# Patient Record
Sex: FEMALE | Race: WHITE | NOT HISPANIC OR LATINO | RURAL
[De-identification: names, ages, dates, MRNs, and addresses within clinical notes are randomized per-mention and may not be internally consistent; named-entity substitution may affect disease eponyms.]

---

## 2019-04-16 ENCOUNTER — HISTORICAL (OUTPATIENT)
Dept: ADMINISTRATIVE | Facility: HOSPITAL | Age: 34
End: 2019-04-16

## 2019-04-18 LAB
LAB AP CLINICAL INFORMATION: NORMAL
LAB AP GENERAL CAT - HISTORICAL: NORMAL
LAB AP INTERPRETATION/RESULT - HISTORICAL: NEGATIVE
LAB AP SPECIMEN ADEQUACY - HISTORICAL: NORMAL
LAB AP SPECIMEN SUBMITTED - HISTORICAL: NORMAL

## 2020-09-02 ENCOUNTER — HISTORICAL (OUTPATIENT)
Dept: ADMINISTRATIVE | Facility: HOSPITAL | Age: 35
End: 2020-09-02

## 2020-09-02 LAB — SARS-COV+SARS-COV-2 AG RESP QL IA.RAPID: NEGATIVE

## 2020-09-08 ENCOUNTER — HISTORICAL (OUTPATIENT)
Dept: ADMINISTRATIVE | Facility: HOSPITAL | Age: 35
End: 2020-09-08

## 2020-09-08 LAB — SARS-COV+SARS-COV-2 AG RESP QL IA.RAPID: NEGATIVE

## 2020-09-10 ENCOUNTER — HISTORICAL (OUTPATIENT)
Dept: ADMINISTRATIVE | Facility: HOSPITAL | Age: 35
End: 2020-09-10

## 2020-09-10 LAB — SARS-COV+SARS-COV-2 AG RESP QL IA.RAPID: NEGATIVE

## 2022-12-27 ENCOUNTER — OFFICE VISIT (OUTPATIENT)
Dept: PRIMARY CARE CLINIC | Facility: CLINIC | Age: 37
End: 2022-12-27
Payer: COMMERCIAL

## 2022-12-27 VITALS
OXYGEN SATURATION: 97 % | HEART RATE: 82 BPM | TEMPERATURE: 98 F | RESPIRATION RATE: 16 BRPM | WEIGHT: 204 LBS | DIASTOLIC BLOOD PRESSURE: 80 MMHG | HEIGHT: 67 IN | BODY MASS INDEX: 32.02 KG/M2 | SYSTOLIC BLOOD PRESSURE: 128 MMHG

## 2022-12-27 DIAGNOSIS — R07.0 PAIN IN THROAT: ICD-10-CM

## 2022-12-27 DIAGNOSIS — J02.9 ACUTE PHARYNGITIS, UNSPECIFIED ETIOLOGY: Primary | ICD-10-CM

## 2022-12-27 PROBLEM — E66.9 OBESITY: Status: ACTIVE | Noted: 2022-12-27

## 2022-12-27 PROBLEM — E78.2 MIXED HYPERLIPIDEMIA: Status: ACTIVE | Noted: 2022-12-27

## 2022-12-27 PROBLEM — F32.A MILD DEPRESSION: Status: ACTIVE | Noted: 2022-12-27

## 2022-12-27 PROBLEM — R13.10 DYSPHAGIA: Status: ACTIVE | Noted: 2022-07-06

## 2022-12-27 PROBLEM — F41.9 MILD ANXIETY: Status: ACTIVE | Noted: 2022-12-27

## 2022-12-27 PROBLEM — E78.5 DYSLIPIDEMIA: Status: ACTIVE | Noted: 2022-12-27

## 2022-12-27 PROCEDURE — 3074F SYST BP LT 130 MM HG: CPT | Mod: CPTII,,, | Performed by: NURSE PRACTITIONER

## 2022-12-27 PROCEDURE — 1159F MED LIST DOCD IN RCRD: CPT | Mod: CPTII,,, | Performed by: NURSE PRACTITIONER

## 2022-12-27 PROCEDURE — 3008F BODY MASS INDEX DOCD: CPT | Mod: CPTII,,, | Performed by: NURSE PRACTITIONER

## 2022-12-27 PROCEDURE — 3074F PR MOST RECENT SYSTOLIC BLOOD PRESSURE < 130 MM HG: ICD-10-PCS | Mod: CPTII,,, | Performed by: NURSE PRACTITIONER

## 2022-12-27 PROCEDURE — 1159F PR MEDICATION LIST DOCUMENTED IN MEDICAL RECORD: ICD-10-PCS | Mod: CPTII,,, | Performed by: NURSE PRACTITIONER

## 2022-12-27 PROCEDURE — 1160F RVW MEDS BY RX/DR IN RCRD: CPT | Mod: CPTII,,, | Performed by: NURSE PRACTITIONER

## 2022-12-27 PROCEDURE — 3008F PR BODY MASS INDEX (BMI) DOCUMENTED: ICD-10-PCS | Mod: CPTII,,, | Performed by: NURSE PRACTITIONER

## 2022-12-27 PROCEDURE — 3079F PR MOST RECENT DIASTOLIC BLOOD PRESSURE 80-89 MM HG: ICD-10-PCS | Mod: CPTII,,, | Performed by: NURSE PRACTITIONER

## 2022-12-27 PROCEDURE — 99203 PR OFFICE/OUTPT VISIT, NEW, LEVL III, 30-44 MIN: ICD-10-PCS | Mod: ,,, | Performed by: NURSE PRACTITIONER

## 2022-12-27 PROCEDURE — 99203 OFFICE O/P NEW LOW 30 MIN: CPT | Mod: ,,, | Performed by: NURSE PRACTITIONER

## 2022-12-27 PROCEDURE — 3079F DIAST BP 80-89 MM HG: CPT | Mod: CPTII,,, | Performed by: NURSE PRACTITIONER

## 2022-12-27 PROCEDURE — 1160F PR REVIEW ALL MEDS BY PRESCRIBER/CLIN PHARMACIST DOCUMENTED: ICD-10-PCS | Mod: CPTII,,, | Performed by: NURSE PRACTITIONER

## 2022-12-27 RX ORDER — PREDNISONE 5 MG/1
5 TABLET ORAL DAILY
COMMUNITY
Start: 2022-12-07

## 2022-12-27 RX ORDER — NORELGESTROMIN AND ETHINYL ESTRADIOL 150; 35 UG/D; UG/D
PATCH TRANSDERMAL
COMMUNITY
Start: 2022-12-03

## 2022-12-27 RX ORDER — AMOXICILLIN AND CLAVULANATE POTASSIUM 875; 125 MG/1; MG/1
1 TABLET, FILM COATED ORAL EVERY 12 HOURS
Qty: 20 TABLET | Refills: 0 | Status: SHIPPED | OUTPATIENT
Start: 2022-12-27 | End: 2023-01-06

## 2022-12-27 RX ORDER — PANTOPRAZOLE SODIUM 40 MG/1
40 TABLET, DELAYED RELEASE ORAL DAILY
COMMUNITY
Start: 2022-11-08

## 2022-12-27 RX ORDER — FLUOXETINE HYDROCHLORIDE 20 MG/1
20 CAPSULE ORAL DAILY
COMMUNITY

## 2022-12-27 NOTE — PROGRESS NOTES
Subjective:       Patient ID: Lori Flynn is a 37 y.o. female.    Chief Complaint: possible strep    Pt presents with sore throat and ear pain x several days.     Review of Systems   Constitutional:  Negative for activity change, appetite change, chills, fatigue and fever.   HENT:  Positive for ear pain and sore throat. Negative for nasal congestion, ear discharge, nosebleeds, postnasal drip, rhinorrhea, sinus pressure/congestion, sneezing and tinnitus.    Eyes:  Negative for pain, discharge, redness and itching.   Respiratory:  Negative for cough, choking, chest tightness, shortness of breath and wheezing.    Cardiovascular:  Negative for chest pain.   Gastrointestinal:  Negative for abdominal distention, abdominal pain, blood in stool, change in bowel habit, constipation, diarrhea, nausea, vomiting and change in bowel habit.   Genitourinary:  Negative for decreased urine volume, dysuria, flank pain, frequency, menstrual irregularity and menstrual problem.   Musculoskeletal:  Negative for back pain and gait problem.   Integumentary:  Negative for wound, breast mass and breast discharge.   Allergic/Immunologic: Negative for immunocompromised state.   Neurological:  Negative for dizziness, light-headedness and headaches.   Psychiatric/Behavioral:  Negative for agitation, behavioral problems and hallucinations.    Breast: Negative for mass      Objective:      Physical Exam  Vitals and nursing note reviewed.   Constitutional:       Appearance: Normal appearance.   HENT:      Head: Normocephalic.      Right Ear: Tympanic membrane normal.      Left Ear: Tympanic membrane normal.      Nose: Congestion present.      Mouth/Throat:      Pharynx: Posterior oropharyngeal erythema present.   Eyes:      Conjunctiva/sclera: Conjunctivae normal.   Cardiovascular:      Rate and Rhythm: Normal rate and regular rhythm.      Heart sounds: Normal heart sounds.   Pulmonary:      Effort: Pulmonary effort is normal.      Breath sounds:  Normal breath sounds.   Musculoskeletal:         General: Normal range of motion.   Neurological:      Mental Status: She is alert and oriented to person, place, and time.   Psychiatric:         Mood and Affect: Mood normal.         Behavior: Behavior normal.       Assessment:       Problem List Items Addressed This Visit    None  Visit Diagnoses       Acute pharyngitis, unspecified etiology    -  Primary    Relevant Medications    amoxicillin-clavulanate 875-125mg (AUGMENTIN) 875-125 mg per tablet    Pain in throat                  Plan:       Continue prednisone as directed. Change toothbrush in 2 days.

## 2023-09-06 ENCOUNTER — OFFICE VISIT (OUTPATIENT)
Dept: OBSTETRICS AND GYNECOLOGY | Facility: CLINIC | Age: 38
End: 2023-09-06
Payer: COMMERCIAL

## 2023-09-06 VITALS
WEIGHT: 190 LBS | SYSTOLIC BLOOD PRESSURE: 132 MMHG | OXYGEN SATURATION: 100 % | DIASTOLIC BLOOD PRESSURE: 81 MMHG | RESPIRATION RATE: 18 BRPM | BODY MASS INDEX: 29.82 KG/M2 | HEART RATE: 86 BPM | HEIGHT: 67 IN

## 2023-09-06 DIAGNOSIS — N94.6 DYSMENORRHEA: ICD-10-CM

## 2023-09-06 DIAGNOSIS — N92.0 MENORRHAGIA WITH REGULAR CYCLE: ICD-10-CM

## 2023-09-06 DIAGNOSIS — N80.9 ENDOMETRIOSIS: Primary | ICD-10-CM

## 2023-09-06 PROCEDURE — 99203 OFFICE O/P NEW LOW 30 MIN: CPT | Mod: S$PBB,,, | Performed by: OBSTETRICS & GYNECOLOGY

## 2023-09-06 PROCEDURE — 3079F PR MOST RECENT DIASTOLIC BLOOD PRESSURE 80-89 MM HG: ICD-10-PCS | Mod: CPTII,,, | Performed by: OBSTETRICS & GYNECOLOGY

## 2023-09-06 PROCEDURE — 3075F PR MOST RECENT SYSTOLIC BLOOD PRESS GE 130-139MM HG: ICD-10-PCS | Mod: CPTII,,, | Performed by: OBSTETRICS & GYNECOLOGY

## 2023-09-06 PROCEDURE — 1159F MED LIST DOCD IN RCRD: CPT | Mod: CPTII,,, | Performed by: OBSTETRICS & GYNECOLOGY

## 2023-09-06 PROCEDURE — 3008F BODY MASS INDEX DOCD: CPT | Mod: CPTII,,, | Performed by: OBSTETRICS & GYNECOLOGY

## 2023-09-06 PROCEDURE — 99203 PR OFFICE/OUTPT VISIT, NEW, LEVL III, 30-44 MIN: ICD-10-PCS | Mod: S$PBB,,, | Performed by: OBSTETRICS & GYNECOLOGY

## 2023-09-06 PROCEDURE — 3075F SYST BP GE 130 - 139MM HG: CPT | Mod: CPTII,,, | Performed by: OBSTETRICS & GYNECOLOGY

## 2023-09-06 PROCEDURE — 1160F PR REVIEW ALL MEDS BY PRESCRIBER/CLIN PHARMACIST DOCUMENTED: ICD-10-PCS | Mod: CPTII,,, | Performed by: OBSTETRICS & GYNECOLOGY

## 2023-09-06 PROCEDURE — 3079F DIAST BP 80-89 MM HG: CPT | Mod: CPTII,,, | Performed by: OBSTETRICS & GYNECOLOGY

## 2023-09-06 PROCEDURE — 1159F PR MEDICATION LIST DOCUMENTED IN MEDICAL RECORD: ICD-10-PCS | Mod: CPTII,,, | Performed by: OBSTETRICS & GYNECOLOGY

## 2023-09-06 PROCEDURE — 1160F RVW MEDS BY RX/DR IN RCRD: CPT | Mod: CPTII,,, | Performed by: OBSTETRICS & GYNECOLOGY

## 2023-09-06 PROCEDURE — 99214 OFFICE O/P EST MOD 30 MIN: CPT | Mod: PBBFAC | Performed by: OBSTETRICS & GYNECOLOGY

## 2023-09-06 PROCEDURE — 3008F PR BODY MASS INDEX (BMI) DOCUMENTED: ICD-10-PCS | Mod: CPTII,,, | Performed by: OBSTETRICS & GYNECOLOGY

## 2023-09-06 NOTE — PROGRESS NOTES
Gynecology History and Physical    Assessment/Plan:   Problem List Items Addressed This Visit          Renal/    Endometriosis - Primary    Menorrhagia with regular cycle    Dysmenorrhea     Discussed treatment options for endometriosis including Mirena IUD. Discussed hormonal side effects would be minimal compared to OCPs due to LARC. Discussed that by 6 months, 80% of women have amenorrhea. She brought up a tubal ligation and an ablation. Discussed with her that I would not recommend this due to the fact that her menses may get lighter or resolve. However, this would not improve pain associated with menses. Also discussed definitive surgical management via a hysterectomy. She verbalized her understanding of the above. She is going to think about it and decide what route she would like to take and call us back.       CC:   Chief Complaint   Patient presents with    Consult     tubal     HPI: Lori Flynn is a 37 y.o. female who presents with complaints of painful and heavy menses. She has been diagnosed with endometriosis. She feels that her menses are becoming more painful and more heavy. She has discussed treatment options with her previous providers. She was considering a Mirena IUD. However, she is hesitant about this due to side effects with hormonal contraception in the past. She has headaches, mood changes, and overall does not feel well. Discussed progresterone only especially long acting is less likely to give her side effects.  She also tried Depo Lupron in the past and hated the side effects. She felt like she was going crazy and the hot flashes and night sweats were miserable.  She had her annual exam in July at another provider's office.    Review of Systems: The following ROS was otherwise negative, except as noted in the HPI:  constitutional, HEENT, respiratory, cardiovascular, gastrointestinal, genitourinary, skin, musculoskeletal, neurological, psych    Gynecologic History:   No history of  "abnormal pap smears  No history of of STIs  Menstrual history:       Obstetrical History:  OB History          2    Para   2    Term   2            AB        Living   2         SAB        IAB        Ectopic        Multiple        Live Births   2           Obstetric Comments   2 FTCS's without complication               Past Medical History:   History reviewed. No pertinent past medical history.    Medications:  Medication List with Changes/Refills   Current Medications    FLUOXETINE 20 MG CAPSULE    Take 20 mg by mouth once daily.    PANTOPRAZOLE (PROTONIX) 40 MG TABLET    Take 40 mg by mouth once daily.    PREDNISONE (DELTASONE) 5 MG TABLET    Take 5 mg by mouth once daily.    XULANE 150-35 MCG/24 HR    SMARTSIG:Topical       Allergies:  Patient has no known allergies.    Surgical History:  Past Surgical History:   Procedure Laterality Date     SECTION     C/S x 2    Family History:  Family History   Problem Relation Age of Onset    Diabetes Mother     Cancer Father        Social History:  Social History     Substance and Sexual Activity   Alcohol Use Not Currently     Social History     Substance and Sexual Activity   Drug Use Never     Social History     Tobacco Use   Smoking Status Never   Smokeless Tobacco Never       Physical Exam:  /81   Pulse 86   Resp 18   Ht 5' 7" (1.702 m)   Wt 86.2 kg (190 lb)   LMP 2023   SpO2 100%   BMI 29.76 kg/m²     General: Alert, well appearing, no acute distress  Head: Normocephalic, atraumatic  Psych: No depression or anxiety.    Labs:  No visits with results within 1 Day(s) from this visit.   Latest known visit with results is:   Historical on 09/10/2020   Component Date Value    COVID-19 Ag 09/10/2020 Negative               "

## 2023-09-08 PROBLEM — N80.9 ENDOMETRIOSIS: Status: ACTIVE | Noted: 2023-09-08

## 2023-09-08 PROBLEM — N94.6 DYSMENORRHEA: Status: ACTIVE | Noted: 2023-09-08

## 2023-09-08 PROBLEM — N92.0 MENORRHAGIA WITH REGULAR CYCLE: Status: ACTIVE | Noted: 2023-09-08

## 2024-01-28 ENCOUNTER — OFFICE VISIT (OUTPATIENT)
Dept: FAMILY MEDICINE | Facility: CLINIC | Age: 39
End: 2024-01-28
Payer: COMMERCIAL

## 2024-01-28 VITALS
RESPIRATION RATE: 20 BRPM | HEART RATE: 86 BPM | WEIGHT: 206 LBS | HEIGHT: 67 IN | DIASTOLIC BLOOD PRESSURE: 83 MMHG | TEMPERATURE: 99 F | SYSTOLIC BLOOD PRESSURE: 126 MMHG | OXYGEN SATURATION: 98 % | BODY MASS INDEX: 32.33 KG/M2

## 2024-01-28 DIAGNOSIS — J02.9 SORE THROAT: ICD-10-CM

## 2024-01-28 DIAGNOSIS — J02.0 STREP PHARYNGITIS: Primary | ICD-10-CM

## 2024-01-28 LAB
CTP QC/QA: YES
S PYO RRNA THROAT QL PROBE: POSITIVE

## 2024-01-28 PROCEDURE — 99051 MED SERV EVE/WKEND/HOLIDAY: CPT | Mod: ,,, | Performed by: NURSE PRACTITIONER

## 2024-01-28 PROCEDURE — 99213 OFFICE O/P EST LOW 20 MIN: CPT | Mod: ,,, | Performed by: NURSE PRACTITIONER

## 2024-01-28 PROCEDURE — 3074F SYST BP LT 130 MM HG: CPT | Mod: CPTII,,, | Performed by: NURSE PRACTITIONER

## 2024-01-28 PROCEDURE — 1160F RVW MEDS BY RX/DR IN RCRD: CPT | Mod: CPTII,,, | Performed by: NURSE PRACTITIONER

## 2024-01-28 PROCEDURE — 1159F MED LIST DOCD IN RCRD: CPT | Mod: CPTII,,, | Performed by: NURSE PRACTITIONER

## 2024-01-28 PROCEDURE — 3079F DIAST BP 80-89 MM HG: CPT | Mod: CPTII,,, | Performed by: NURSE PRACTITIONER

## 2024-01-28 PROCEDURE — 3008F BODY MASS INDEX DOCD: CPT | Mod: CPTII,,, | Performed by: NURSE PRACTITIONER

## 2024-01-28 PROCEDURE — 87880 STREP A ASSAY W/OPTIC: CPT | Mod: QW,,, | Performed by: NURSE PRACTITIONER

## 2024-01-28 RX ORDER — AMOXICILLIN 500 MG/1
500 TABLET, FILM COATED ORAL EVERY 12 HOURS
Qty: 20 TABLET | Refills: 0 | Status: SHIPPED | OUTPATIENT
Start: 2024-01-28 | End: 2024-02-07

## 2024-01-28 NOTE — PROGRESS NOTES
SHANTEL Pineda   [facility] - [facility]  [address] [place] [address]  [phone]      PATIENT NAME: Lori Flynn  : 1985  DATE: 24  MRN: [id number]      Billing Provider: SHANTEL Pineda  Level of Service: [place] OFFICE/OUTPT VISIT, EST, LEVL III, 20-29 MIN  Patient PCP Information       Provider PCP Type    Yasmany Villegas MD General            Reason for Visit / Chief Complaint: Otalgia (Both Ears ), Sore Throat (X2 days; 10 PS ), and Fever (X1 days )       Update PCP  Update Chief Complaint         History of Present Illness / Problem Focused Workflow       Patient presents to clinic with the above listed complaints. She is COVID positive today.       Review of Systems     Review of Systems   Constitutional:  Positive for fever. Negative for chills, diaphoresis and fatigue.   HENT:  Positive for ear pain and sore throat. Negative for congestion, facial swelling and trouble swallowing.    Eyes:  Negative for pain, discharge, redness, itching and visual disturbance.   Respiratory:  Negative for apnea, cough, chest tightness, shortness of breath and wheezing.    Cardiovascular:  Negative for chest pain, palpitations and leg swelling.   Gastrointestinal:  Negative for abdominal pain, constipation, diarrhea, nausea and vomiting.   Genitourinary:  Negative for dysuria.   Skin:  Negative for rash.   Neurological:  Negative for dizziness and headaches.       Medical / Social / Family History   History reviewed. No pertinent past medical history.    Past Surgical History:   Procedure Laterality Date     SECTION         Social History  Ms.  reports that she has never smoked. She has never used smokeless tobacco. She reports that she does not currently use alcohol. She reports that she does not use drugs.    Family History  Ms.'s family history includes Cancer in her father; Diabetes in her mother.    Medications  and Allergies     Medications  No outpatient medications have been marked as taking for the 1/28/24 encounter (Office Visit) with Sanam Morgan FNP.       Allergies  Review of patient's allergies indicates:  No Known Allergies    Physical Examination     Vitals:    01/28/24 1459   BP: 126/83   Pulse: 86   Resp: 20   Temp: 98.7 °F (37.1 °C)     Physical Exam  Vitals and nursing note reviewed.   Constitutional:       General: She is awake.      Appearance: Normal appearance.   HENT:      Head: Normocephalic.      Right Ear: Tympanic membrane, ear canal and external ear normal.      Left Ear: Tympanic membrane, ear canal and external ear normal.      Nose: Nose normal.      Mouth/Throat:      Lips: Pink.      Mouth: Mucous membranes are moist.      Pharynx: Oropharynx is clear. Uvula midline. Posterior oropharyngeal erythema present.      Comments: History of tonsillectomy  Eyes:      General: Lids are normal.      Extraocular Movements: Extraocular movements intact.      Conjunctiva/sclera: Conjunctivae normal.      Pupils: Pupils are equal, round, and reactive to light.   Cardiovascular:      Rate and Rhythm: Normal rate and regular rhythm.      Pulses: Normal pulses.      Heart sounds: Normal heart sounds. No murmur heard.  Pulmonary:      Effort: Pulmonary effort is normal.      Breath sounds: Normal breath sounds. No decreased breath sounds, wheezing, rhonchi or rales.   Musculoskeletal:      Right lower leg: No edema.      Left lower leg: No edema.   Skin:     General: Skin is warm.      Coloration: Skin is not jaundiced.      Findings: No rash.   Neurological:      Mental Status: She is alert. Mental status is at baseline.   Psychiatric:         Mood and Affect: Mood normal.         Behavior: Behavior normal. Behavior is cooperative.         Assessment and Plan (including Health Maintenance)      Problem List  Smart Sets  Document Outside HM   :    Health Maintenance Due   Topic Date Due    Hepatitis C  Screening  Never done    Lipid Panel  Never done    COVID-19 Vaccine (1) Never done    HIV Screening  Never done    TETANUS VACCINE  Never done    Mammogram  Never done    Hemoglobin A1c (Diabetic Prevention Screening)  Never done    Cervical Cancer Screening  04/16/2022    Influenza Vaccine (1) Never done       Problem List Items Addressed This Visit    None  Visit Diagnoses       Strep pharyngitis    -  Primary    Sore throat        Relevant Orders    POCT rapid strep A (Completed)            The patient has no Health Maintenance topics of status Not Due    No future appointments.       Signature:  SHANTEL Pineda [facility] - [facility] - [facility]  [address] [place] [address] [place] [address]  [phone]    Date of encounter: 1/28/24

## 2024-01-28 NOTE — LETTER
January 28, 2024    Lori Flynn  [address] [address] [facility]  [place] [address]             [facility] - [facility] Medicine  Family Medicine  [address] [place] [address] [place] [address]  Phone: [phone]  Fax: [phone]   January 28, 2024     Patient: Lori Flynn   YOB: 1985   Date of Visit: 1/28/2024       To Whom it May Concern:    Lori Flynn was seen in my clinic on 1/28/2024. She may return to work on 01/30/2024 .    Please excuse her from any classes or work missed.    If you have any questions or concerns, please don't hesitate to call.    Sincerely,         Sanam Morgan, FNP

## 2024-04-09 ENCOUNTER — OFFICE VISIT (OUTPATIENT)
Dept: PRIMARY CARE CLINIC | Facility: CLINIC | Age: 39
End: 2024-04-09
Payer: COMMERCIAL

## 2024-04-09 ENCOUNTER — PATIENT MESSAGE (OUTPATIENT)
Dept: PRIMARY CARE CLINIC | Facility: CLINIC | Age: 39
End: 2024-04-09
Payer: COMMERCIAL

## 2024-04-09 VITALS
BODY MASS INDEX: 33.27 KG/M2 | HEIGHT: 67 IN | WEIGHT: 212 LBS | OXYGEN SATURATION: 99 % | SYSTOLIC BLOOD PRESSURE: 126 MMHG | TEMPERATURE: 99 F | DIASTOLIC BLOOD PRESSURE: 84 MMHG

## 2024-04-09 DIAGNOSIS — Z13.220 ENCOUNTER FOR SCREENING FOR LIPOID DISORDERS: ICD-10-CM

## 2024-04-09 DIAGNOSIS — E78.5 DYSLIPIDEMIA: ICD-10-CM

## 2024-04-09 DIAGNOSIS — Z11.59 NEED FOR HEPATITIS C SCREENING TEST: ICD-10-CM

## 2024-04-09 DIAGNOSIS — Z12.4 SCREENING FOR MALIGNANT NEOPLASM OF CERVIX: ICD-10-CM

## 2024-04-09 DIAGNOSIS — F41.9 ANXIETY: ICD-10-CM

## 2024-04-09 DIAGNOSIS — E66.09 CLASS 1 OBESITY DUE TO EXCESS CALORIES WITH BODY MASS INDEX (BMI) OF 33.0 TO 33.9 IN ADULT, UNSPECIFIED WHETHER SERIOUS COMORBIDITY PRESENT: ICD-10-CM

## 2024-04-09 DIAGNOSIS — Z11.4 SCREENING FOR HIV (HUMAN IMMUNODEFICIENCY VIRUS): ICD-10-CM

## 2024-04-09 DIAGNOSIS — Z13.1 SCREENING FOR DIABETES MELLITUS: ICD-10-CM

## 2024-04-09 DIAGNOSIS — Z12.39 ENCOUNTER FOR SCREENING FOR MALIGNANT NEOPLASM OF BREAST, UNSPECIFIED SCREENING MODALITY: ICD-10-CM

## 2024-04-09 DIAGNOSIS — Z00.01 ENCOUNTER FOR GENERAL ADULT MEDICAL EXAMINATION WITH ABNORMAL FINDINGS: Primary | ICD-10-CM

## 2024-04-09 PROCEDURE — 99396 PREV VISIT EST AGE 40-64: CPT | Mod: ,,, | Performed by: NURSE PRACTITIONER

## 2024-04-09 PROCEDURE — 3008F BODY MASS INDEX DOCD: CPT | Mod: CPTII,,, | Performed by: NURSE PRACTITIONER

## 2024-04-09 PROCEDURE — 3079F DIAST BP 80-89 MM HG: CPT | Mod: CPTII,,, | Performed by: NURSE PRACTITIONER

## 2024-04-09 PROCEDURE — 1159F MED LIST DOCD IN RCRD: CPT | Mod: CPTII,,, | Performed by: NURSE PRACTITIONER

## 2024-04-09 PROCEDURE — 1160F RVW MEDS BY RX/DR IN RCRD: CPT | Mod: CPTII,,, | Performed by: NURSE PRACTITIONER

## 2024-04-09 PROCEDURE — 3074F SYST BP LT 130 MM HG: CPT | Mod: CPTII,,, | Performed by: NURSE PRACTITIONER

## 2024-04-09 RX ORDER — TIRZEPATIDE 5 MG/.5ML
5 INJECTION, SOLUTION SUBCUTANEOUS
Qty: 12 PEN | Refills: 3 | Status: SHIPPED | OUTPATIENT
Start: 2024-05-09 | End: 2024-04-15

## 2024-04-09 RX ORDER — FLUOXETINE HYDROCHLORIDE 20 MG/1
20 CAPSULE ORAL DAILY
Qty: 90 CAPSULE | Refills: 3 | Status: SHIPPED | OUTPATIENT
Start: 2024-04-09

## 2024-04-09 RX ORDER — BUSPIRONE HYDROCHLORIDE 10 MG/1
10 TABLET ORAL 2 TIMES DAILY
Qty: 180 TABLET | Refills: 3 | Status: SHIPPED | OUTPATIENT
Start: 2024-04-09

## 2024-04-09 RX ORDER — TIRZEPATIDE 2.5 MG/.5ML
2.5 INJECTION, SOLUTION SUBCUTANEOUS
Qty: 4 PEN | Refills: 0 | Status: SHIPPED | OUTPATIENT
Start: 2024-04-09 | End: 2024-04-15

## 2024-04-09 NOTE — PROGRESS NOTES
Subjective     Patient ID: Lori Flynn is a 38 y.o. female.    Chief Complaint: wellness (Pt Concerns Weight and anxiety.)    Pt presents for a wellness visit and to establish care.       Review of Systems   Constitutional:  Negative for activity change, appetite change, chills, fatigue, fever and unexpected weight change.   HENT:  Negative for nasal congestion, ear discharge, hearing loss, nosebleeds, postnasal drip, rhinorrhea, sinus pressure/congestion, sneezing, sore throat, tinnitus and trouble swallowing.    Eyes:  Negative for pain, discharge, redness, itching and visual disturbance.   Respiratory:  Negative for cough, choking, chest tightness, shortness of breath and wheezing.    Cardiovascular:  Negative for chest pain and palpitations.   Gastrointestinal:  Negative for abdominal distention, abdominal pain, blood in stool, change in bowel habit, constipation, diarrhea, nausea and vomiting.   Endocrine: Negative for polydipsia and polyuria.   Genitourinary:  Negative for decreased urine volume, difficulty urinating, dysuria, flank pain, frequency, hematuria, menstrual irregularity and menstrual problem.   Musculoskeletal:  Negative for arthralgias, back pain, gait problem, joint swelling and neck pain.   Integumentary:  Negative for wound, breast mass and breast discharge.   Allergic/Immunologic: Negative for immunocompromised state.   Neurological:  Negative for dizziness, weakness, light-headedness and headaches.   Psychiatric/Behavioral:  Negative for agitation, behavioral problems, confusion, dysphoric mood and hallucinations. The patient is nervous/anxious.    Breast: Negative for mass         Objective     Physical Exam  Vitals and nursing note reviewed.   Constitutional:       Appearance: Normal appearance.   Cardiovascular:      Rate and Rhythm: Normal rate and regular rhythm.      Heart sounds: Normal heart sounds.   Pulmonary:      Effort: Pulmonary effort is normal.      Breath sounds: Normal  breath sounds.   Musculoskeletal:         General: Normal range of motion.   Neurological:      Mental Status: She is alert and oriented to person, place, and time.   Psychiatric:         Mood and Affect: Mood normal.         Behavior: Behavior normal.            Assessment and Plan     1. Encounter for general adult medical examination with abnormal findings    2. BMI 33.0-33.9,adult    3. Need for hepatitis C screening test  -     Hepatitis C Antibody; Future; Expected date: 04/09/2024    4. Screening for HIV (human immunodeficiency virus)  -     HIV 1/2 Ag/Ab (4th Gen); Future; Expected date: 04/09/2024    5. Encounter for screening for lipoid disorders  -     Lipid Panel; Future; Expected date: 04/09/2024    6. Dyslipidemia  -     CBC Auto Differential; Future; Expected date: 04/09/2024  -     Comprehensive Metabolic Panel; Future; Expected date: 04/09/2024  -     TSH; Future; Expected date: 04/09/2024    7. Screening for diabetes mellitus  -     Hemoglobin A1C; Future; Expected date: 04/09/2024    8. Anxiety  -     busPIRone (BUSPAR) 10 MG tablet; Take 1 tablet (10 mg total) by mouth 2 (two) times daily.  Dispense: 180 tablet; Refill: 3  -     FLUoxetine 20 MG capsule; Take 1 capsule (20 mg total) by mouth once daily.  Dispense: 90 capsule; Refill: 3    9. Class 1 obesity due to excess calories with body mass index (BMI) of 33.0 to 33.9 in adult, unspecified whether serious comorbidity present  -     tirzepatide, weight loss, (ZEPBOUND) 2.5 mg/0.5 mL PnIj; Inject 2.5 mg into the skin every 7 days.  Dispense: 4 Pen; Refill: 0  -     tirzepatide, weight loss, (ZEPBOUND) 5 mg/0.5 mL PnIj; Inject 5 mg into the skin every 7 days.  Dispense: 12 Pen; Refill: 3    10. Encounter for screening for malignant neoplasm of breast, unspecified screening modality  -     Mammo Digital Screening Bilat w/ Jovanni; Future; Expected date: 04/09/2024    11. Screening for malignant neoplasm of cervix  -     Ambulatory referral/consult  to Obstetrics / Gynecology; Future; Expected date: 04/16/2024        Will call pt with lab results. Gave pt the appointments for gyn and mammogram. Discussed low sugar diet and exercise 3-5 times weekly.          Follow up in about 1 year (around 4/9/2025).

## 2024-04-10 ENCOUNTER — HOSPITAL ENCOUNTER (OUTPATIENT)
Dept: RADIOLOGY | Facility: HOSPITAL | Age: 39
Discharge: HOME | End: 2024-04-10
Attending: NURSE PRACTITIONER
Payer: COMMERCIAL

## 2024-04-10 DIAGNOSIS — Z12.39 ENCOUNTER FOR SCREENING FOR MALIGNANT NEOPLASM OF BREAST, UNSPECIFIED SCREENING MODALITY: ICD-10-CM

## 2024-04-10 PROCEDURE — 77063 BREAST TOMOSYNTHESIS BI: CPT | Mod: TC

## 2024-04-15 RX ORDER — SEMAGLUTIDE 0.25 MG/.5ML
0.25 INJECTION, SOLUTION SUBCUTANEOUS
Qty: 4 EACH | Refills: 0 | Status: SHIPPED | OUTPATIENT
Start: 2024-04-15 | End: 2024-04-25 | Stop reason: CLARIF

## 2024-04-21 ENCOUNTER — HOSPITAL ENCOUNTER (EMERGENCY)
Facility: HOSPITAL | Age: 39
Discharge: HOME | End: 2024-04-22
Attending: FAMILY MEDICINE
Payer: COMMERCIAL

## 2024-04-21 DIAGNOSIS — N20.0 NEPHROLITHIASIS: Primary | ICD-10-CM

## 2024-04-21 LAB
B-HCG UR QL: NEGATIVE
CTP QC/QA: YES

## 2024-04-21 PROCEDURE — 81025 URINE PREGNANCY TEST: CPT | Performed by: FAMILY MEDICINE

## 2024-04-21 PROCEDURE — 81001 URINALYSIS AUTO W/SCOPE: CPT | Performed by: FAMILY MEDICINE

## 2024-04-21 PROCEDURE — 99285 EMERGENCY DEPT VISIT HI MDM: CPT | Mod: ,,, | Performed by: FAMILY MEDICINE

## 2024-04-21 PROCEDURE — 85025 COMPLETE CBC W/AUTO DIFF WBC: CPT | Performed by: FAMILY MEDICINE

## 2024-04-21 PROCEDURE — 99285 EMERGENCY DEPT VISIT HI MDM: CPT | Mod: 25

## 2024-04-21 PROCEDURE — 80053 COMPREHEN METABOLIC PANEL: CPT | Performed by: FAMILY MEDICINE

## 2024-04-21 RX ORDER — ONDANSETRON HYDROCHLORIDE 2 MG/ML
4 INJECTION, SOLUTION INTRAVENOUS
Status: COMPLETED | OUTPATIENT
Start: 2024-04-22 | End: 2024-04-22

## 2024-04-21 RX ORDER — HYDROMORPHONE HYDROCHLORIDE 2 MG/ML
2 INJECTION, SOLUTION INTRAMUSCULAR; INTRAVENOUS; SUBCUTANEOUS
Status: COMPLETED | OUTPATIENT
Start: 2024-04-22 | End: 2024-04-22

## 2024-04-21 NOTE — Clinical Note
"Silvestre BECERRILGENARO Flynn was seen and treated in our emergency department on 4/21/2024.  She may return to work on 04/25/2024.       If you have any questions or concerns, please don't hesitate to call.      Lion Abad, DO"

## 2024-04-22 VITALS
SYSTOLIC BLOOD PRESSURE: 132 MMHG | BODY MASS INDEX: 32.02 KG/M2 | OXYGEN SATURATION: 96 % | RESPIRATION RATE: 18 BRPM | DIASTOLIC BLOOD PRESSURE: 84 MMHG | WEIGHT: 204 LBS | HEART RATE: 76 BPM | TEMPERATURE: 98 F | HEIGHT: 67 IN

## 2024-04-22 LAB
ALBUMIN SERPL BCP-MCNC: 3.8 G/DL (ref 3.5–5)
ALBUMIN/GLOB SERPL: 1.2 {RATIO}
ALP SERPL-CCNC: 48 U/L (ref 37–98)
ALT SERPL W P-5'-P-CCNC: 20 U/L (ref 13–56)
ANION GAP SERPL CALCULATED.3IONS-SCNC: 9 MMOL/L (ref 7–16)
AST SERPL W P-5'-P-CCNC: 15 U/L (ref 15–37)
BACTERIA #/AREA URNS HPF: ABNORMAL /HPF
BASOPHILS # BLD AUTO: 0.06 K/UL (ref 0–0.2)
BASOPHILS NFR BLD AUTO: 0.8 % (ref 0–1)
BILIRUB SERPL-MCNC: 0.4 MG/DL (ref ?–1.2)
BILIRUB UR QL STRIP: NEGATIVE
BUN SERPL-MCNC: 21 MG/DL (ref 7–18)
BUN/CREAT SERPL: 22 (ref 6–20)
CALCIUM SERPL-MCNC: 9.6 MG/DL (ref 8.5–10.1)
CHLORIDE SERPL-SCNC: 110 MMOL/L (ref 98–107)
CLARITY UR: CLEAR
CO2 SERPL-SCNC: 27 MMOL/L (ref 21–32)
COLOR UR: YELLOW
CREAT SERPL-MCNC: 0.95 MG/DL (ref 0.55–1.02)
DIFFERENTIAL METHOD BLD: ABNORMAL
EGFR (NO RACE VARIABLE) (RUSH/TITUS): 78 ML/MIN/1.73M2
EOSINOPHIL # BLD AUTO: 0.38 K/UL (ref 0–0.5)
EOSINOPHIL NFR BLD AUTO: 4.9 % (ref 1–4)
ERYTHROCYTE [DISTWIDTH] IN BLOOD BY AUTOMATED COUNT: 12.6 % (ref 11.5–14.5)
GLOBULIN SER-MCNC: 3.3 G/DL (ref 2–4)
GLUCOSE SERPL-MCNC: 149 MG/DL (ref 74–106)
GLUCOSE UR STRIP-MCNC: NORMAL MG/DL
HCT VFR BLD AUTO: 39.4 % (ref 38–47)
HGB BLD-MCNC: 12.6 G/DL (ref 12–16)
IMM GRANULOCYTES # BLD AUTO: 0.02 K/UL (ref 0–0.04)
IMM GRANULOCYTES NFR BLD: 0.3 % (ref 0–0.4)
KETONES UR STRIP-SCNC: NEGATIVE MG/DL
LEUKOCYTE ESTERASE UR QL STRIP: NEGATIVE
LYMPHOCYTES # BLD AUTO: 2.52 K/UL (ref 1–4.8)
LYMPHOCYTES NFR BLD AUTO: 32.2 % (ref 27–41)
MCH RBC QN AUTO: 28.1 PG (ref 27–31)
MCHC RBC AUTO-ENTMCNC: 32 G/DL (ref 32–36)
MCV RBC AUTO: 87.8 FL (ref 80–96)
MONOCYTES # BLD AUTO: 0.53 K/UL (ref 0–0.8)
MONOCYTES NFR BLD AUTO: 6.8 % (ref 2–6)
MPC BLD CALC-MCNC: 10.7 FL (ref 9.4–12.4)
MUCOUS, UA: ABNORMAL /LPF
NEUTROPHILS # BLD AUTO: 4.31 K/UL (ref 1.8–7.7)
NEUTROPHILS NFR BLD AUTO: 55 % (ref 53–65)
NITRITE UR QL STRIP: NEGATIVE
NRBC # BLD AUTO: 0 X10E3/UL
NRBC, AUTO (.00): 0 %
PH UR STRIP: 6.5 PH UNITS
PLATELET # BLD AUTO: 253 K/UL (ref 150–400)
POTASSIUM SERPL-SCNC: 3.6 MMOL/L (ref 3.5–5.1)
PROT SERPL-MCNC: 7.1 G/DL (ref 6.4–8.2)
PROT UR QL STRIP: NEGATIVE
RBC # BLD AUTO: 4.49 M/UL (ref 4.2–5.4)
RBC # UR STRIP: ABNORMAL /UL
RBC #/AREA URNS HPF: >182 /HPF
SODIUM SERPL-SCNC: 142 MMOL/L (ref 136–145)
SP GR UR STRIP: 1.02
SQUAMOUS #/AREA URNS LPF: ABNORMAL /HPF
UROBILINOGEN UR STRIP-ACNC: NORMAL MG/DL
WBC # BLD AUTO: 7.82 K/UL (ref 4.5–11)
WBC #/AREA URNS HPF: <1 /HPF

## 2024-04-22 PROCEDURE — 96375 TX/PRO/DX INJ NEW DRUG ADDON: CPT

## 2024-04-22 PROCEDURE — 25000003 PHARM REV CODE 250: Performed by: FAMILY MEDICINE

## 2024-04-22 PROCEDURE — 96361 HYDRATE IV INFUSION ADD-ON: CPT

## 2024-04-22 PROCEDURE — 63600175 PHARM REV CODE 636 W HCPCS: Performed by: FAMILY MEDICINE

## 2024-04-22 PROCEDURE — 96374 THER/PROPH/DIAG INJ IV PUSH: CPT

## 2024-04-22 RX ORDER — HYDROMORPHONE HYDROCHLORIDE 2 MG/1
2 TABLET ORAL EVERY 4 HOURS PRN
Qty: 18 TABLET | Refills: 0 | Status: SHIPPED | OUTPATIENT
Start: 2024-04-22 | End: 2024-05-10

## 2024-04-22 RX ORDER — ONDANSETRON 4 MG/1
4 TABLET, FILM COATED ORAL EVERY 6 HOURS
Qty: 12 TABLET | Refills: 0 | Status: SHIPPED | OUTPATIENT
Start: 2024-04-22

## 2024-04-22 RX ORDER — NITROFURANTOIN 25; 75 MG/1; MG/1
100 CAPSULE ORAL EVERY 12 HOURS
Status: DISCONTINUED | OUTPATIENT
Start: 2024-04-22 | End: 2024-04-22

## 2024-04-22 RX ORDER — NITROFURANTOIN 25; 75 MG/1; MG/1
100 CAPSULE ORAL 2 TIMES DAILY
Qty: 10 CAPSULE | Refills: 0 | Status: SHIPPED | OUTPATIENT
Start: 2024-04-22 | End: 2024-04-27

## 2024-04-22 RX ADMIN — SODIUM CHLORIDE 1000 ML: 9 INJECTION, SOLUTION INTRAVENOUS at 12:04

## 2024-04-22 RX ADMIN — ONDANSETRON 4 MG: 2 INJECTION INTRAMUSCULAR; INTRAVENOUS at 12:04

## 2024-04-22 RX ADMIN — HYDROMORPHONE HYDROCHLORIDE 2 MG: 2 INJECTION, SOLUTION INTRAMUSCULAR; INTRAVENOUS; SUBCUTANEOUS at 12:04

## 2024-04-22 NOTE — ED TRIAGE NOTES
Pt presents cc of pain that has been going on since 9pm that goes from her back to her front pelvis. Pt states that she has thrown up randomly but that she doesn't feel nauseated when she vomits.

## 2024-04-22 NOTE — ED PROVIDER NOTES
Encounter Date: 2024    SCRIBE #1 NOTE: I, Karina Bettencourt, am scribing for, and in the presence of,  Lion Abad DO. I have scribed the entire note.       History     Chief Complaint   Patient presents with    Flank Pain     Left side     38 year old female presents to the ED complaining of flank pain. Patient says that around 0900 PM she started having left-sided flank pain that radiates to the front of her abdomen. She states that the pain just hit her all of a sudden. Patient denies any previous Hx of kidney stones. She reports that she has had emesis earlier.     The history is provided by the patient. No  was used.     Review of patient's allergies indicates:  No Known Allergies  Past Medical History:   Diagnosis Date    Endometriosis 2023    Mixed anxiety and depressive disorder     Mixed hyperlipidemia 2022     Past Surgical History:   Procedure Laterality Date     SECTION       Family History   Problem Relation Name Age of Onset    Diabetes Mother      Cancer Father       Social History     Tobacco Use    Smoking status: Never    Smokeless tobacco: Never   Substance Use Topics    Alcohol use: Not Currently    Drug use: Never     Review of Systems   Constitutional:  Negative for fever.   HENT:  Negative for sore throat.    Respiratory:  Negative for shortness of breath.    Cardiovascular:  Negative for chest pain.   Gastrointestinal:  Positive for vomiting. Negative for nausea.   Genitourinary:  Positive for flank pain. Negative for dysuria.   Musculoskeletal:  Negative for back pain.   Skin:  Negative for rash.   Neurological:  Negative for weakness.   Hematological:  Does not bruise/bleed easily.   All other systems reviewed and are negative.      Physical Exam     Initial Vitals [24 2335]   BP Pulse Resp Temp SpO2   132/84 76 18 98.1 °F (36.7 °C) 96 %      MAP       --         Physical Exam    Nursing note and vitals reviewed.  Constitutional: She  appears well-developed and well-nourished.   HENT:   Head: Normocephalic and atraumatic.   Right Ear: External ear normal.   Left Ear: External ear normal.   Nose: Nose normal.   Mouth/Throat: Oropharynx is clear and moist.   Eyes: Conjunctivae and EOM are normal. Pupils are equal, round, and reactive to light.   Neck: Neck supple.   Normal range of motion.  Cardiovascular:  Normal rate, regular rhythm, normal heart sounds and intact distal pulses.           Pulmonary/Chest: Breath sounds normal.   Abdominal: Abdomen is soft. Bowel sounds are normal.   Genitourinary:    Vagina and uterus normal.     Musculoskeletal:         General: Normal range of motion.      Cervical back: Normal range of motion and neck supple.     Neurological: She is alert and oriented to person, place, and time. She has normal strength and normal reflexes.   Skin: Skin is warm. Capillary refill takes less than 2 seconds.   Psychiatric: She has a normal mood and affect. Her behavior is normal. Judgment and thought content normal.         ED Course   Procedures  Labs Reviewed   URINALYSIS, REFLEX TO URINE CULTURE - Abnormal; Notable for the following components:       Result Value    Blood, UA Large (*)     All other components within normal limits   COMPREHENSIVE METABOLIC PANEL - Abnormal; Notable for the following components:    Chloride 110 (*)     Glucose 149 (*)     BUN 21 (*)     BUN/Creatinine Ratio 22 (*)     All other components within normal limits   CBC WITH DIFFERENTIAL - Abnormal; Notable for the following components:    Monocytes % 6.8 (*)     Eosinophils % 4.9 (*)     All other components within normal limits   URINALYSIS, MICROSCOPIC - Abnormal; Notable for the following components:    RBC, UA >182 (*)     Bacteria, UA Few (*)     Squamous Epithelial Cells, UA Occasional (*)     Mucous Occasional (*)     All other components within normal limits   CBC W/ AUTO DIFFERENTIAL    Narrative:     The following orders were created for  panel order CBC Auto Differential.  Procedure                               Abnormality         Status                     ---------                               -----------         ------                     CBC with Differential[[id number]]       Abnormal            Final result                 Please view results for these tests on the individual orders.   POCT URINE PREGNANCY          Imaging Results              CT Renal Stone Study ABD Pelvis WO (Final result)  Result time 04/22/24 07:43:10      Final result by Yohannes Sagastume MD (04/22/24 07:43:10)                   Impression:      Mild left-sided hydronephrosis secondary to a 3 mm calculus in the proximal left ureter      Electronically signed by: Yohannes Sagastume  Date:    04/22/2024  Time:    07:43               Narrative:    EXAMINATION:  CT ABDOMEN AND PELVIS without IV contrast    CLINICAL HISTORY:  Nephrolithiasis, uncomplicated.  Left-sided flank pain    TECHNIQUE:  Axial CT images were obtained through the abdomen and pelvis without IV contrast.  Oral contrast was not given.  Coronal and sagittal reconstructions submitted and interpreted.  Total .8 mGycm.  Automated exposure control utilized.    COMPARISON:  No previous similar    FINDINGS:  CT abdomen:    Partially visualized lung bases are clear.  There is no gross pleural or pericardial effusion.    There is mild left-sided hydronephrosis secondary to a 3 mm calculus in the proximal left ureter at the L2-L3 disc space level.  No additional radiopaque calculi are seen.  There is no gross renal mass seen on this noncontrast study.    Liver, bile ducts, spleen, pancreas, gallbladder, and adrenal glands are unremarkable.    There is a small hiatal hernia.  Stomach is grossly unremarkable but not well distended.  Appendix appears normal.  There is no briana bowel obstruction.    There is no abdominal aorta aneurysm.    There is no lymphadenopathy by short-axis diameter criteria.    There  is a tiny periumbilical hernia containing fat.    CT pelvis:    Urinary bladder is mildly distended.  No soft tissue mass of the pelvis is seen.  Tampon is noted in the vaginal vault.    No acute osseous findings.                                       Medications   HYDROmorphone (PF) injection 2 mg (2 mg Intravenous Given 4/22/24 0010)   ondansetron injection 4 mg (4 mg Intravenous Given 4/22/24 0010)   sodium chloride 0.9% bolus 1,000 mL 1,000 mL (0 mLs Intravenous Stopped 4/22/24 0122)     Medical Decision Making  Amount and/or Complexity of Data Reviewed  Labs: ordered.  Radiology: ordered.    Risk  Prescription drug management.              Attending Attestation:           Physician Attestation for Scribe:  Physician Attestation Statement for Scribe #1: I, Lion Abad DO, reviewed documentation, as scribed by Karina Bettencourt in my presence, and it is both accurate and complete.                        Medical Decision Making:   Initial Assessment:   38 year old female presents to the ED complaining of flank pain. Patient says that around 0900 PM she started having left-sided flank pain that radiates to the front of her abdomen. She states that the pain just hit her all of a sudden. Patient denies any previous Hx of kidney stones. She reports that she has had emesis earlier.     The history is provided by the patient. No  was used.     Differential Diagnosis:   Patient with left flank pain started suddenly at 9 p.m. tonight possible renal stone just has blood in her urine             Clinical Impression:  Final diagnoses:  [N20.0] Nephrolithiasis (Primary)          ED Disposition Condition    Discharge Stable          ED Prescriptions       Medication Sig Dispense Start Date End Date Auth. Provider    ondansetron (ZOFRAN) 4 MG tablet Take 1 tablet (4 mg total) by mouth every 6 (six) hours. 12 tablet 4/22/2024 -- Lion Abad DO    HYDROmorphone (DILAUDID) 2 MG tablet Take 1 tablet  (2 mg total) by mouth every 4 (four) hours as needed for Pain. 18 tablet 4/22/2024 5/10/2024 Lion Abad, DO    nitrofurantoin, macrocrystal-monohydrate, (MACROBID) 100 MG capsule Take 1 capsule (100 mg total) by mouth 2 (two) times daily. for 5 days 10 capsule 4/22/2024 4/27/2024 Lion Abad, DO          Follow-up Information    None          Lion Abad,   04/24/24 1639

## 2024-04-22 NOTE — ED NOTES
Pt resting comfortably. Spouse at bedside. Blackburn and pillow given. Pt visible more comfortable than arrival. Call bell within pt reach. Instructed pt to press button for any needs.

## 2024-04-22 NOTE — ED PROVIDER NOTES
"Encounter Date: 2024       History     Chief Complaint   Patient presents with    Flank Pain     Left side     HPI  Review of patient's allergies indicates:  No Known Allergies  Past Medical History:   Diagnosis Date    Endometriosis 2023    Mixed anxiety and depressive disorder     Mixed hyperlipidemia 2022     Past Surgical History:   Procedure Laterality Date     SECTION       Family History   Problem Relation Name Age of Onset    Diabetes Mother      Cancer Father       Social History     Tobacco Use    Smoking status: Never    Smokeless tobacco: Never   Substance Use Topics    Alcohol use: Not Currently    Drug use: Never     Review of Systems    Physical Exam     Initial Vitals [24 2335]   BP Pulse Resp Temp SpO2   132/84 76 18 98.1 °F (36.7 °C) 96 %      MAP       --         Physical Exam    Medical Screening Exam   See Full Note    ED Course   Procedures  Labs Reviewed   URINALYSIS, REFLEX TO URINE CULTURE   POCT URINE PREGNANCY          Imaging Results    None          Medications - No data to display  Medical Decision Making  Amount and/or Complexity of Data Reviewed  Labs: ordered.                                      Clinical Impression:    ***Please document a Clinical Impression and click the "Refresh" button to refresh your note and automatically pull in before signing.***         "

## 2024-04-25 DIAGNOSIS — E66.09 CLASS 1 OBESITY DUE TO EXCESS CALORIES WITH BODY MASS INDEX (BMI) OF 33.0 TO 33.9 IN ADULT, UNSPECIFIED WHETHER SERIOUS COMORBIDITY PRESENT: ICD-10-CM

## 2024-04-25 RX ORDER — TIRZEPATIDE 2.5 MG/.5ML
2.5 INJECTION, SOLUTION SUBCUTANEOUS
Qty: 4 PEN | Refills: 0 | Status: SHIPPED | OUTPATIENT
Start: 2024-04-25

## 2024-06-26 ENCOUNTER — TELEPHONE (OUTPATIENT)
Dept: OBSTETRICS AND GYNECOLOGY | Facility: CLINIC | Age: 39
End: 2024-06-26
Payer: COMMERCIAL

## 2024-06-26 NOTE — TELEPHONE ENCOUNTER
Left message for pt to return call to clinic and that I would attempt to reach out through the portal.     ----- Message from Olivia Reno sent at 6/26/2024 11:52 AM CDT -----  Pt made appt from her [facility] for Mon for a Mirena insertion. She called to make sure it would be in stock. This was discussed on her Oct appt. Please let her know if she will need to resc appt at [phone]    Who Called: Lori Flynn    Caller is requesting assistance/information from provider's office.      Preferred Method of Contact: Phone Call  Patient's Preferred Phone Number on File: [phone]   Best Call Back Number, if different:  Additional Information:

## 2024-06-27 DIAGNOSIS — Z30.430 ENCOUNTER FOR INSERTION OF INTRAUTERINE CONTRACEPTIVE DEVICE (IUD): Primary | ICD-10-CM

## 2024-07-25 ENCOUNTER — OFFICE VISIT (OUTPATIENT)
Dept: PRIMARY CARE CLINIC | Facility: CLINIC | Age: 39
End: 2024-07-25
Payer: COMMERCIAL

## 2024-07-25 VITALS
WEIGHT: 206 LBS | SYSTOLIC BLOOD PRESSURE: 124 MMHG | OXYGEN SATURATION: 98 % | HEIGHT: 67 IN | HEART RATE: 78 BPM | BODY MASS INDEX: 32.33 KG/M2 | DIASTOLIC BLOOD PRESSURE: 82 MMHG | TEMPERATURE: 98 F

## 2024-07-25 DIAGNOSIS — M54.42 CHRONIC MIDLINE LOW BACK PAIN WITH BILATERAL SCIATICA: Primary | ICD-10-CM

## 2024-07-25 DIAGNOSIS — M54.41 CHRONIC MIDLINE LOW BACK PAIN WITH BILATERAL SCIATICA: Primary | ICD-10-CM

## 2024-07-25 DIAGNOSIS — G89.29 CHRONIC MIDLINE LOW BACK PAIN WITH BILATERAL SCIATICA: Primary | ICD-10-CM

## 2024-07-25 PROCEDURE — 99213 OFFICE O/P EST LOW 20 MIN: CPT | Mod: 25,,, | Performed by: NURSE PRACTITIONER

## 2024-07-25 PROCEDURE — 3079F DIAST BP 80-89 MM HG: CPT | Mod: CPTII,,, | Performed by: NURSE PRACTITIONER

## 2024-07-25 PROCEDURE — 1159F MED LIST DOCD IN RCRD: CPT | Mod: CPTII,,, | Performed by: NURSE PRACTITIONER

## 2024-07-25 PROCEDURE — 3008F BODY MASS INDEX DOCD: CPT | Mod: CPTII,,, | Performed by: NURSE PRACTITIONER

## 2024-07-25 PROCEDURE — 96372 THER/PROPH/DIAG INJ SC/IM: CPT | Mod: ,,, | Performed by: NURSE PRACTITIONER

## 2024-07-25 PROCEDURE — 3074F SYST BP LT 130 MM HG: CPT | Mod: CPTII,,, | Performed by: NURSE PRACTITIONER

## 2024-07-25 PROCEDURE — 3044F HG A1C LEVEL LT 7.0%: CPT | Mod: CPTII,,, | Performed by: NURSE PRACTITIONER

## 2024-07-25 PROCEDURE — 1160F RVW MEDS BY RX/DR IN RCRD: CPT | Mod: CPTII,,, | Performed by: NURSE PRACTITIONER

## 2024-07-25 RX ORDER — METHYLPREDNISOLONE ACETATE 40 MG/ML
40 INJECTION, SUSPENSION INTRA-ARTICULAR; INTRALESIONAL; INTRAMUSCULAR; SOFT TISSUE
Status: COMPLETED | OUTPATIENT
Start: 2024-07-25 | End: 2024-07-25

## 2024-07-25 RX ORDER — NORELGESTROMIN AND ETHINYL ESTRADIOL 150; 35 UG/D; UG/D
PATCH TRANSDERMAL
COMMUNITY
Start: 2024-05-19

## 2024-07-25 RX ORDER — DEXAMETHASONE SODIUM PHOSPHATE 4 MG/ML
4 INJECTION, SOLUTION INTRA-ARTICULAR; INTRALESIONAL; INTRAMUSCULAR; INTRAVENOUS; SOFT TISSUE
Status: COMPLETED | OUTPATIENT
Start: 2024-07-25 | End: 2024-07-25

## 2024-07-25 RX ORDER — KETOROLAC TROMETHAMINE 30 MG/ML
60 INJECTION, SOLUTION INTRAMUSCULAR; INTRAVENOUS
Status: COMPLETED | OUTPATIENT
Start: 2024-07-25 | End: 2024-07-25

## 2024-07-25 RX ADMIN — KETOROLAC TROMETHAMINE 60 MG: 30 INJECTION, SOLUTION INTRAMUSCULAR; INTRAVENOUS at 04:07

## 2024-07-25 RX ADMIN — METHYLPREDNISOLONE ACETATE 40 MG: 40 INJECTION, SUSPENSION INTRA-ARTICULAR; INTRALESIONAL; INTRAMUSCULAR; SOFT TISSUE at 04:07

## 2024-07-25 RX ADMIN — DEXAMETHASONE SODIUM PHOSPHATE 4 MG: 4 INJECTION, SOLUTION INTRA-ARTICULAR; INTRALESIONAL; INTRAMUSCULAR; INTRAVENOUS; SOFT TISSUE at 04:07

## 2024-07-25 NOTE — PROGRESS NOTES
Subjective     Patient ID: Lori Flynn is a 38 y.o. female.    Chief Complaint: Back Pain    Pt presents with low back pain that radiates to Bl legs x several days. Pt denies injury.     Back Pain  This is a recurrent problem. The current episode started more than 1 year ago. The problem occurs rarely. The problem has been rapidly worsening since onset. The pain is present in the sacro-iliac. The quality of the pain is described as shooting. The pain radiates to the left thigh. The pain is at a severity of 7/10. The pain is moderate. The pain is The same all the time. The symptoms are aggravated by bending and standing. Stiffness is present All day. Associated symptoms include leg pain. Pertinent negatives include no abdominal pain, bladder incontinence, bowel incontinence, chest pain, dysuria, fever, headaches, numbness, paresis, paresthesias, pelvic pain, perianal numbness, tingling, weakness or weight loss. The treatment provided no relief.     Review of Systems   Constitutional:  Negative for activity change, appetite change, chills, fatigue, fever and weight loss.   HENT:  Negative for nasal congestion, ear discharge, nosebleeds, postnasal drip, rhinorrhea, sinus pressure/congestion, sneezing, sore throat and tinnitus.    Eyes:  Negative for pain, discharge, redness and itching.   Respiratory:  Negative for cough, choking, chest tightness, shortness of breath and wheezing.    Cardiovascular:  Negative for chest pain.   Gastrointestinal:  Negative for abdominal distention, abdominal pain, blood in stool, bowel incontinence, change in bowel habit, constipation, diarrhea, nausea and vomiting.   Genitourinary:  Negative for bladder incontinence, decreased urine volume, dysuria, flank pain, frequency, hematuria, menstrual irregularity, menstrual problem and pelvic pain.   Musculoskeletal:  Positive for back pain and leg pain. Negative for gait problem.   Integumentary:  Negative for wound, breast mass and breast  discharge.   Allergic/Immunologic: Negative for immunocompromised state.   Neurological:  Negative for dizziness, tingling, weakness, light-headedness, numbness, headaches and paresthesias.   Psychiatric/Behavioral:  Negative for agitation, behavioral problems and hallucinations.    Breast: Negative for mass         Objective     Physical Exam  Vitals and nursing note reviewed.   Constitutional:       Appearance: Normal appearance.   Cardiovascular:      Rate and Rhythm: Normal rate and regular rhythm.      Heart sounds: Normal heart sounds.   Pulmonary:      Effort: Pulmonary effort is normal.      Breath sounds: Normal breath sounds.   Musculoskeletal:         General: Normal range of motion.   Neurological:      Mental Status: She is alert and oriented to person, place, and time.   Psychiatric:         Mood and Affect: Mood normal.         Behavior: Behavior normal.            Assessment and Plan     1. Chronic midline low back pain with bilateral sciatica  -     dexAMETHasone injection 4 mg  -     methylPREDNISolone acetate injection 40 mg  -     ketorolac injection 60 mg        Notify clinic if symptoms worsen or persist. Ibuprofen OTC as directed.          Follow up if symptoms worsen or fail to improve.

## 2024-08-14 NOTE — PROGRESS NOTES
Annual Exam    Assessment/Plan:  38 y.o.  presenting for her annual exam:    Problem List Items Addressed This Visit    None  Visit Diagnoses       Well woman exam with routine gynecological exam    -  Primary    Relevant Orders    ThinPrep Pap Test    Screening for malignant neoplasm of cervix        Screening for malignant neoplasm of the cervix        Relevant Orders    ThinPrep Pap Test    Encounter for IUD insertion        Relevant Medications    miSOPROStoL (CYTOTEC) 200 MCG Tab    Menorrhagia with regular cycle        Dysmenorrhea              Annual exam with PAP performed.  F/u for screening MMG as scheduled.  F/u with primary care as scheduled.  Continue Xulane patch until IUD insertion next week.  The night prior to IUD insertion take Cytotec. Discussed IUD insertion process and what to expect.     RTC in 1 year for annual exam and/or prn.    CC:   Chief Complaint   Patient presents with    Annual Exam     Denies any concerns.       HPI:  38 y.o.  presents for her gynecologic annual exam. She is doing well today. She is still having heavy, painful menses. She started the Xulane patch a few months ago to help regulate menses in anticipation of vacation. This is working well for her. Menses are lighter and monthly and less painful on this.  She does have a h/o endometriosis.  However, she does desires the Mirena IUD. IUD insertion is scheduled for 2024.    Patient seen and examined.      Health Maintenance:    Birth control: Xulane patch  Pregnancy plans: Wanting contraception   Safe relationship: yes  Healthy diet: ?   Exercise: ?  PCP: See below.     Screening:  Last pap smear: 2019 negative  History of abnormal pap smears: Denies  STI screening: Declines  Mammogram: Scheduled    Review of Systems: The following ROS was otherwise negative, except as noted in the HPI:  constitutional, HEENT, respiratory, cardiovascular, gastrointestinal, genitourinary, skin, musculoskeletal,  neurological, psych    Primary Care Physician: Liss Rodríguez FNP    Obstetric History  OB History    Para Term  AB Living   2 2 2     2   SAB IAB Ectopic Multiple Live Births           2      # Outcome Date GA Lbr Herman/2nd Weight Sex Type Anes PTL Lv   2 Term            1 Term               Obstetric Comments   2 FTCS's without complication       Gynecologic History  Menstrual History:   LMP: 2024    Age of Menarche: 10   Age of Menopause: NA   Menstrual Period: regular   Interval Between Menses: 1 month   Duration of Menses: 5-7 days    Menstrual Flow: heavy   Bleeding between menses: No    Sexual History:    Contraception: see above   Currently is sexually active   Denies history of STIs   Denies sexual problems    Pap History:   History of abnormal pap smears: see above   Last pap: see above    Medical History:  Past Medical History:   Diagnosis Date    Endometriosis 2023    Mixed anxiety and depressive disorder     Mixed hyperlipidemia 2022       Medications:  Medication List with Changes/Refills   New Medications    MISOPROSTOL (CYTOTEC) 200 MCG TAB    Take 2 tablets (400 mcg total) by mouth once. The night before IUD insertion. for 1 dose   Current Medications    BUSPIRONE (BUSPAR) 10 MG TABLET    Take 1 tablet (10 mg total) by mouth 2 (two) times daily.    FLUOXETINE 20 MG CAPSULE    Take 1 capsule (20 mg total) by mouth once daily.    ONDANSETRON (ZOFRAN) 4 MG TABLET    Take 1 tablet (4 mg total) by mouth every 6 (six) hours.    TIRZEPATIDE, WEIGHT LOSS, (ZEPBOUND) 2.5 MG/0.5 ML PNIJ    Inject 2.5 mg into the skin every 7 days.    XULANE 150-35 MCG/24 HR    APPLY 1 PATCH BY TRANSDERMAL ROUTE EVERY WEEK FOR 3 WEEKS, OFF THE 4TH WEEK         Surgical History:  Past Surgical History:   Procedure Laterality Date     SECTION         Allergies:  Review of patient's allergies indicates:  No Known Allergies    Family History:  Family History   Problem Relation Name Age of  "Onset    Diabetes Mother      Cancer Father         Social History:  Social History     Socioeconomic History    Marital status:      Spouse name: crystal    Number of children: 2   Tobacco Use    Smoking status: Never    Smokeless tobacco: Never   Substance and Sexual Activity    Alcohol use: Not Currently    Drug use: Never    Sexual activity: Yes     Partners: Male     Birth control/protection: Patch     Social Determinants of Health     Financial Resource Strain: Low Risk  (4/8/2024)    Overall Financial Resource Strain (CARDIA)     Difficulty of Paying Living Expenses: Not hard at all   Food Insecurity: No Food Insecurity (4/8/2024)    Hunger Vital Sign     Worried About Running Out of Food in the Last Year: Never true     Ran Out of Food in the Last Year: Never true   Transportation Needs: No Transportation Needs (4/8/2024)    PRAPARE - Transportation     Lack of Transportation (Medical): No     Lack of Transportation (Non-Medical): No   Physical Activity: Sufficiently Active (4/8/2024)    Exercise Vital Sign     Days of Exercise per Week: 5 days     Minutes of Exercise per Session: 60 min   Stress: Stress Concern Present (4/8/2024)    [employer] - Occupational Stress Questionnaire     Feeling of Stress : To some extent   Housing Stability: Low Risk  (4/8/2024)    Housing Stability Vital Sign     Unable to Pay for Housing in the Last Year: No     Number of Places Lived in the Last Year: 1     Unstable Housing in the Last Year: No       Objective:  Body mass index is 32.2 kg/m².    /60   Pulse 84   Ht 5' 7" (1.702 m)   Wt 93.3 kg (205 lb 9.6 oz)   LMP 08/07/2024 (Exact Date)   BMI 32.20 kg/m²     General: Alert, well appearing, no acute distress  Head: Normocephalic, atraumatic  Breasts: Symmetric, non-tender to palpation, no skin changes, palpable axillary lymph nodes or masses noted  Lungs: Unlabored respirations. Clear to auscultation bilaterally.  Cardiovascular: " Regular rate and rhythm.   Abdomen: Soft, nontender, nondistended   Pelvic: Exam chaperoned by female assistant.   External: normal female genitalia, no masses or lesions   Vagina: moist, pink mucosa with rugae, physiologic discharge. Minimal amount of old blood in vault.  Cervix: no masses or lesions, nontender. PAP performed.  Uterus: approx 8 weeks, mobile, midline, nontender  Adnexa: no masses or fullness, nontender  Rectovaginal: deferred  Extremities: No redness or tenderness  Skin: Well perfused, normal coloration and turgor, no lesions or rashes visualized  Neuro: Alert, oriented, normal speech, no focal deficits, moves extremities appropriately  Psych: Normal mood and behavior.     Shonda Conklin MD     Answers submitted by the patient for this visit:  Gynecologic Exam Questionnaire  (Submitted on 8/15/2024)  Chief Complaint: Gynecologic exam  genital itching: No  genital lesions: No  genital odor: No  genital rash: No  missed menses: No  pelvic pain: No  vaginal bleeding: No  vaginal discharge: No  Pregnant now?: No  abdominal pain: No  anorexia: No  back pain: No  chills: No  constipation: No  diarrhea: No  discolored urine: No  dysuria: No  fever: No  flank pain: No  frequency: No  headaches: No  hematuria: No  nausea: No  painful intercourse: No  rash: No  urgency: No  vomiting: No  Vaginal bleeding: heavier than menses  Passing clots?: Yes  Passing tissue?: No  Sexual activity: sexually active  Partner with STD symptoms: no  Menstrual history: regular  STD: No  abdominal surgery: No   section: Yes  Ectopic pregnancy: No  Endometriosis: Yes  herpes simplex: No  gynecological surgery: No  menorrhagia: Yes  metrorrhagia: No  miscarriage: No  ovarian cysts: No  perineal abscess: No  PID: No  terminated pregnancy: No  vaginosis: No

## 2024-08-15 ENCOUNTER — OFFICE VISIT (OUTPATIENT)
Dept: OBSTETRICS AND GYNECOLOGY | Facility: CLINIC | Age: 39
End: 2024-08-15
Payer: COMMERCIAL

## 2024-08-15 VITALS
DIASTOLIC BLOOD PRESSURE: 60 MMHG | SYSTOLIC BLOOD PRESSURE: 120 MMHG | WEIGHT: 205.63 LBS | BODY MASS INDEX: 32.27 KG/M2 | HEART RATE: 84 BPM | HEIGHT: 67 IN

## 2024-08-15 DIAGNOSIS — N92.0 MENORRHAGIA WITH REGULAR CYCLE: ICD-10-CM

## 2024-08-15 DIAGNOSIS — Z12.4 SCREENING FOR MALIGNANT NEOPLASM OF CERVIX: ICD-10-CM

## 2024-08-15 DIAGNOSIS — Z12.4 SCREENING FOR MALIGNANT NEOPLASM OF THE CERVIX: ICD-10-CM

## 2024-08-15 DIAGNOSIS — Z30.430 ENCOUNTER FOR IUD INSERTION: ICD-10-CM

## 2024-08-15 DIAGNOSIS — Z01.419 WELL WOMAN EXAM WITH ROUTINE GYNECOLOGICAL EXAM: Primary | ICD-10-CM

## 2024-08-15 DIAGNOSIS — N94.6 DYSMENORRHEA: ICD-10-CM

## 2024-08-15 PROCEDURE — 99213 OFFICE O/P EST LOW 20 MIN: CPT | Mod: PBBFAC | Performed by: OBSTETRICS & GYNECOLOGY

## 2024-08-15 PROCEDURE — 99999 PR PBB SHADOW E&M-EST. PATIENT-LVL III: CPT | Mod: PBBFAC,,, | Performed by: OBSTETRICS & GYNECOLOGY

## 2024-08-15 PROCEDURE — 99459 PELVIC EXAMINATION: CPT | Mod: PBBFAC | Performed by: OBSTETRICS & GYNECOLOGY

## 2024-08-16 RX ORDER — MISOPROSTOL 200 UG/1
400 TABLET ORAL ONCE
Qty: 2 TABLET | Refills: 0 | Status: SHIPPED | OUTPATIENT
Start: 2024-08-16 | End: 2024-08-16

## 2024-08-21 ENCOUNTER — PROCEDURE VISIT (OUTPATIENT)
Dept: OBSTETRICS AND GYNECOLOGY | Facility: CLINIC | Age: 39
End: 2024-08-21
Payer: COMMERCIAL

## 2024-08-21 VITALS
SYSTOLIC BLOOD PRESSURE: 132 MMHG | WEIGHT: 208.81 LBS | BODY MASS INDEX: 32.77 KG/M2 | HEART RATE: 69 BPM | HEIGHT: 67 IN | DIASTOLIC BLOOD PRESSURE: 91 MMHG

## 2024-08-21 DIAGNOSIS — Z01.818 PRE-OP TESTING: Primary | ICD-10-CM

## 2024-08-21 DIAGNOSIS — Z30.430 ENCOUNTER FOR INSERTION OF INTRAUTERINE CONTRACEPTIVE DEVICE (IUD): ICD-10-CM

## 2024-08-21 LAB
B-HCG UR QL: NEGATIVE
CTP QC/QA: YES

## 2024-08-21 PROCEDURE — 58300 INSERT INTRAUTERINE DEVICE: CPT | Mod: PBBFAC | Performed by: OBSTETRICS & GYNECOLOGY

## 2024-08-21 PROCEDURE — 99999PBSHW PR PBB SHADOW TECHNICAL ONLY FILED TO HB: Mod: PBBFAC,,,

## 2024-08-21 PROCEDURE — 99999PBSHW POCT URINE PREGNANCY: Mod: PBBFAC,,,

## 2024-08-21 PROCEDURE — 81025 URINE PREGNANCY TEST: CPT | Mod: PBBFAC | Performed by: OBSTETRICS & GYNECOLOGY

## 2024-08-21 RX ADMIN — LEVONORGESTREL 1 INTRA UTERINE DEVICE: 52 INTRAUTERINE DEVICE INTRAUTERINE at 03:08

## 2024-08-21 NOTE — PROCEDURES
Insertion of IUD-Future    Date/Time: 8/21/2024 3:00 PM    Performed by: Shonda Conklin MD  Authorized by: Shonda Conklin MD    Consent:     Consent obtained:  Prior to procedure the appropriate consent was completed and verified    Consent given by:  Patient    Procedure risks and benefits discussed: yes      Patient questions answered: yes      Patient agrees, verbalizes understanding, and wants to proceed: yes     Device to be inserted was verified by patient: yes    Educational handouts given: yes      Instructions and paperwork completed: yes    Insertion Procedure:   1 Intra Uterine Device levonorgestreL 52 mg       Pelvic exam performed: yes      Negative GC/chlamydia test: no      Negative urine pregnancy test: yes      Negative serum pregnancy test: no      Cervix cleaned and prepped: yes      Speculum placed in vagina: yes      Tenaculum applied to cervix: yes (Allis clamp)      Uterus sounded: yes      Uterus sound depth (cm):  8    IUD inserted with no complications: yes      IUD type:  Mirena    Strings trimmed: yes    Post-procedure:     Patient tolerated procedure well: yes      Patient will follow up after next period: yes    Comments:      IUD Insertion Procedure Note    Pre-operative Diagnosis: Menorrhagia  Post-operative Diagnosis: Same  Indication: Contraception    Procedure Details   Urine pregnancy test was done and result was negative.  The risks (including infection, bleeding, pain, and uterine perforation) and benefits of the procedure were explained to the patient and written informed consent was obtained.      Cervix cleansed with Betadine. Uterus sounded to cm. IUD inserted without difficulty. String visible and trimmed to 3 cm. Patient tolerated procedure well.    IUD Information:  Type of IUD: Mirena  Lot#[id number]  Expiration: 06/01/2026    Condition:  Stable    Complications:  None    Plan:  The patient was advised to call for any fever or for prolonged or severe pain or bleeding. She  was advised to use NSAID as needed for mild to moderate pain.

## 2024-09-18 NOTE — PROGRESS NOTES
"Return Gyn Office Visit    Assessment/Plan  Problem List Items Addressed This Visit    None  Visit Diagnoses       IUD check up    -  Primary          US confirms IUD placement.   Discussed irregular bleeding side effect for up to 3 months. However, if consistently bleeding > 7 days, then call and will call in a short course of Estradiol. She verbalized her understanding of this.    CC:   Chief Complaint   Patient presents with    Follow-up     C/o cramping after iud insertion     HPI:  39 y.o. who presents to office for IUD check.   Reviewed her US shows the IUD is in place.  She is doing well. She is having some cramping, but it is improving with time. She has not had any heavy menses. She is having intermittent spotting. Overall she is happy with the IUD.     Review of Systems - The following ROS was otherwise negative, except as noted in the HPI:  constitutional, respiratory, cardiovascular, gastrointestinal, genitourinary    Objective:  /85 (BP Location: Right arm, Patient Position: Sitting)   Pulse 80   Ht 5' 7" (1.702 m)   Wt 93.5 kg (206 lb 3.2 oz)   LMP  (LMP Unknown)   SpO2 95%   BMI 32.30 kg/m²   General: Alert, well appearing, no acute distress  Abdomen: Soft, nontender, nondistended   Pelvic: Normal External genitalia without masses or lesions.  Moist, pink vagina with physiologic discharge.  Cervix without polyps or masses. IUD strings visualized. Uterus mobile and midline without masses.  Adnexa palpated bilaterally without masses or tenderness.  Bimanual examination without masses or tenderness.  Exam chaperoned by female assistant  Extremities: No redness or tenderness, neg Derick's sign  Psych: Normal mood and behavior.      Shonda Conklin MD     Answers submitted by the patient for this visit:  Gynecologic Exam Questionnaire  (Submitted on 2024)  Chief Complaint: Gynecologic exam  STD: No  abdominal surgery: No   section: Yes  Ectopic pregnancy: No  Endometriosis: " Yes  herpes simplex: No  gynecological surgery: No  menorrhagia: Yes  metrorrhagia: No  miscarriage: No  ovarian cysts: No  perineal abscess: No  PID: No  terminated pregnancy: No  vaginosis: No

## 2024-09-19 ENCOUNTER — HOSPITAL ENCOUNTER (OUTPATIENT)
Dept: RADIOLOGY | Facility: HOSPITAL | Age: 39
Discharge: HOME | End: 2024-09-19
Attending: OBSTETRICS & GYNECOLOGY
Payer: COMMERCIAL

## 2024-09-19 ENCOUNTER — OFFICE VISIT (OUTPATIENT)
Dept: OBSTETRICS AND GYNECOLOGY | Facility: CLINIC | Age: 39
End: 2024-09-19
Attending: OBSTETRICS & GYNECOLOGY
Payer: COMMERCIAL

## 2024-09-19 VITALS
SYSTOLIC BLOOD PRESSURE: 135 MMHG | BODY MASS INDEX: 32.36 KG/M2 | HEIGHT: 67 IN | DIASTOLIC BLOOD PRESSURE: 85 MMHG | WEIGHT: 206.19 LBS | HEART RATE: 80 BPM | OXYGEN SATURATION: 95 %

## 2024-09-19 DIAGNOSIS — Z30.431 IUD CHECK UP: Primary | ICD-10-CM

## 2024-09-19 DIAGNOSIS — Z30.430 ENCOUNTER FOR INSERTION OF INTRAUTERINE CONTRACEPTIVE DEVICE (IUD): ICD-10-CM

## 2024-09-19 PROCEDURE — 1159F MED LIST DOCD IN RCRD: CPT | Mod: CPTII,,, | Performed by: OBSTETRICS & GYNECOLOGY

## 2024-09-19 PROCEDURE — 3044F HG A1C LEVEL LT 7.0%: CPT | Mod: CPTII,,, | Performed by: OBSTETRICS & GYNECOLOGY

## 2024-09-19 PROCEDURE — 99999 PR PBB SHADOW E&M-EST. PATIENT-LVL III: CPT | Mod: PBBFAC,,, | Performed by: OBSTETRICS & GYNECOLOGY

## 2024-09-19 PROCEDURE — 1160F RVW MEDS BY RX/DR IN RCRD: CPT | Mod: CPTII,,, | Performed by: OBSTETRICS & GYNECOLOGY

## 2024-09-19 PROCEDURE — 3075F SYST BP GE 130 - 139MM HG: CPT | Mod: CPTII,,, | Performed by: OBSTETRICS & GYNECOLOGY

## 2024-09-19 PROCEDURE — 99213 OFFICE O/P EST LOW 20 MIN: CPT | Mod: PBBFAC,25 | Performed by: OBSTETRICS & GYNECOLOGY

## 2024-09-19 PROCEDURE — 76830 TRANSVAGINAL US NON-OB: CPT | Mod: TC

## 2024-09-19 PROCEDURE — 3008F BODY MASS INDEX DOCD: CPT | Mod: CPTII,,, | Performed by: OBSTETRICS & GYNECOLOGY

## 2024-09-19 PROCEDURE — 76830 TRANSVAGINAL US NON-OB: CPT | Mod: 26,,, | Performed by: RADIOLOGY

## 2024-09-19 PROCEDURE — 3079F DIAST BP 80-89 MM HG: CPT | Mod: CPTII,,, | Performed by: OBSTETRICS & GYNECOLOGY

## 2024-09-19 PROCEDURE — 99213 OFFICE O/P EST LOW 20 MIN: CPT | Mod: S$PBB,,, | Performed by: OBSTETRICS & GYNECOLOGY

## 2025-03-12 ENCOUNTER — OFFICE VISIT (OUTPATIENT)
Dept: FAMILY MEDICINE | Facility: CLINIC | Age: 40
End: 2025-03-12
Payer: COMMERCIAL

## 2025-03-12 VITALS
HEIGHT: 67 IN | TEMPERATURE: 99 F | OXYGEN SATURATION: 98 % | BODY MASS INDEX: 32.71 KG/M2 | RESPIRATION RATE: 18 BRPM | DIASTOLIC BLOOD PRESSURE: 80 MMHG | WEIGHT: 208.38 LBS | SYSTOLIC BLOOD PRESSURE: 126 MMHG | HEART RATE: 84 BPM

## 2025-03-12 DIAGNOSIS — J01.80 ACUTE NON-RECURRENT SINUSITIS OF OTHER SINUS: Primary | ICD-10-CM

## 2025-03-12 DIAGNOSIS — J02.9 SORE THROAT: ICD-10-CM

## 2025-03-12 LAB
CTP QC/QA: YES
MOLECULAR STREP A: NEGATIVE

## 2025-03-12 PROCEDURE — 1159F MED LIST DOCD IN RCRD: CPT | Mod: CPTII,,, | Performed by: NURSE PRACTITIONER

## 2025-03-12 PROCEDURE — 87651 STREP A DNA AMP PROBE: CPT | Mod: QW,,, | Performed by: NURSE PRACTITIONER

## 2025-03-12 PROCEDURE — 3079F DIAST BP 80-89 MM HG: CPT | Mod: CPTII,,, | Performed by: NURSE PRACTITIONER

## 2025-03-12 PROCEDURE — 1160F RVW MEDS BY RX/DR IN RCRD: CPT | Mod: CPTII,,, | Performed by: NURSE PRACTITIONER

## 2025-03-12 PROCEDURE — 3074F SYST BP LT 130 MM HG: CPT | Mod: CPTII,,, | Performed by: NURSE PRACTITIONER

## 2025-03-12 PROCEDURE — 3008F BODY MASS INDEX DOCD: CPT | Mod: CPTII,,, | Performed by: NURSE PRACTITIONER

## 2025-03-12 PROCEDURE — 96372 THER/PROPH/DIAG INJ SC/IM: CPT | Mod: ,,, | Performed by: NURSE PRACTITIONER

## 2025-03-12 PROCEDURE — 99213 OFFICE O/P EST LOW 20 MIN: CPT | Mod: 25,,, | Performed by: NURSE PRACTITIONER

## 2025-03-12 RX ORDER — PROMETHAZINE HYDROCHLORIDE AND DEXTROMETHORPHAN HYDROBROMIDE 6.25; 15 MG/5ML; MG/5ML
5 SYRUP ORAL EVERY 4 HOURS PRN
Qty: 473 ML | Refills: 0 | Status: SHIPPED | OUTPATIENT
Start: 2025-03-12

## 2025-03-12 RX ORDER — DEXAMETHASONE SODIUM PHOSPHATE 4 MG/ML
4 INJECTION, SOLUTION INTRA-ARTICULAR; INTRALESIONAL; INTRAMUSCULAR; INTRAVENOUS; SOFT TISSUE
Status: COMPLETED | OUTPATIENT
Start: 2025-03-12 | End: 2025-03-12

## 2025-03-12 RX ORDER — METHYLPREDNISOLONE ACETATE 40 MG/ML
40 INJECTION, SUSPENSION INTRA-ARTICULAR; INTRALESIONAL; INTRAMUSCULAR; SOFT TISSUE
Status: COMPLETED | OUTPATIENT
Start: 2025-03-12 | End: 2025-03-12

## 2025-03-12 RX ORDER — AMOXICILLIN AND CLAVULANATE POTASSIUM 875; 125 MG/1; MG/1
1 TABLET, FILM COATED ORAL EVERY 12 HOURS
Qty: 20 TABLET | Refills: 0 | Status: SHIPPED | OUTPATIENT
Start: 2025-03-12 | End: 2025-03-22

## 2025-03-12 RX ADMIN — DEXAMETHASONE SODIUM PHOSPHATE 4 MG: 4 INJECTION, SOLUTION INTRA-ARTICULAR; INTRALESIONAL; INTRAMUSCULAR; INTRAVENOUS; SOFT TISSUE at 08:03

## 2025-03-12 RX ADMIN — METHYLPREDNISOLONE ACETATE 40 MG: 40 INJECTION, SUSPENSION INTRA-ARTICULAR; INTRALESIONAL; INTRAMUSCULAR; SOFT TISSUE at 08:03

## 2025-03-12 NOTE — PROGRESS NOTES
Subjective     Patient ID: Lori Flynn is a 39 y.o. female.    Chief Complaint: Sore Throat, Otalgia, Headache, and Cough (Spitting up greenish thick phlegm.)    Pt presents with sinus symptoms for over 1 week.       Review of Systems   Constitutional:  Negative for activity change, appetite change, chills, fatigue and fever.   HENT:  Positive for nasal congestion, rhinorrhea and sinus pressure/congestion. Negative for ear discharge, nosebleeds, postnasal drip, sneezing, sore throat and tinnitus.    Eyes:  Negative for pain, discharge, redness and itching.   Respiratory:  Positive for cough. Negative for choking, chest tightness, shortness of breath and wheezing.    Cardiovascular:  Negative for chest pain.   Gastrointestinal:  Negative for abdominal distention, abdominal pain, blood in stool, change in bowel habit, constipation, diarrhea, nausea and vomiting.   Genitourinary:  Negative for decreased urine volume, dysuria, flank pain, frequency, menstrual irregularity and menstrual problem.   Musculoskeletal:  Negative for back pain and gait problem.   Integumentary:  Negative for wound, breast mass and breast discharge.   Allergic/Immunologic: Negative for immunocompromised state.   Neurological:  Negative for dizziness, light-headedness and headaches.   Psychiatric/Behavioral:  Negative for agitation, behavioral problems and hallucinations.    Breast: Negative for mass         Objective     Physical Exam  Vitals and nursing note reviewed.   Constitutional:       Appearance: Normal appearance.   HENT:      Head: Normocephalic.      Right Ear: Tympanic membrane normal.      Left Ear: Tympanic membrane normal.      Nose: Congestion present.      Mouth/Throat:      Mouth: Mucous membranes are moist.   Eyes:      Conjunctiva/sclera: Conjunctivae normal.   Cardiovascular:      Rate and Rhythm: Normal rate and regular rhythm.      Heart sounds: Normal heart sounds.   Pulmonary:      Effort: Pulmonary effort is normal.       Breath sounds: Normal breath sounds.   Musculoskeletal:         General: Normal range of motion.   Neurological:      Mental Status: She is alert and oriented to person, place, and time.   Psychiatric:         Mood and Affect: Mood normal.         Behavior: Behavior normal.            Assessment and Plan     1. Acute non-recurrent sinusitis of other sinus  -     dexAMETHasone injection 4 mg  -     methylPREDNISolone acetate injection 40 mg  -     amoxicillin-clavulanate 875-125mg (AUGMENTIN) 875-125 mg per tablet; Take 1 tablet by mouth every 12 (twelve) hours. for 10 days  Dispense: 20 tablet; Refill: 0  -     promethazine-dextromethorphan (PROMETHAZINE-DM) 6.25-15 mg/5 mL Syrp; Take 5 mLs by mouth every 4 (four) hours as needed (cough). Do not take while driving  Dispense: 473 mL; Refill: 0        Notify clinic if symptoms worsen or persist.          No follow-ups on file.

## 2025-03-17 ENCOUNTER — OFFICE VISIT (OUTPATIENT)
Dept: BEHAVIORAL HEALTH | Facility: CLINIC | Age: 40
End: 2025-03-17
Payer: COMMERCIAL

## 2025-03-17 VITALS
HEIGHT: 67 IN | WEIGHT: 204 LBS | RESPIRATION RATE: 20 BRPM | TEMPERATURE: 99 F | OXYGEN SATURATION: 97 % | SYSTOLIC BLOOD PRESSURE: 112 MMHG | BODY MASS INDEX: 32.02 KG/M2 | HEART RATE: 59 BPM | DIASTOLIC BLOOD PRESSURE: 60 MMHG

## 2025-03-17 DIAGNOSIS — F99 INSOMNIA DUE TO OTHER MENTAL DISORDER: ICD-10-CM

## 2025-03-17 DIAGNOSIS — Z79.899 ENCOUNTER FOR LONG-TERM (CURRENT) USE OF MEDICATIONS: ICD-10-CM

## 2025-03-17 DIAGNOSIS — F90.2 ADHD (ATTENTION DEFICIT HYPERACTIVITY DISORDER), COMBINED TYPE: ICD-10-CM

## 2025-03-17 DIAGNOSIS — F51.05 INSOMNIA DUE TO OTHER MENTAL DISORDER: ICD-10-CM

## 2025-03-17 DIAGNOSIS — F41.1 GENERALIZED ANXIETY DISORDER: Primary | ICD-10-CM

## 2025-03-17 LAB

## 2025-03-17 PROCEDURE — 80305 DRUG TEST PRSMV DIR OPT OBS: CPT | Mod: QW,,, | Performed by: NURSE PRACTITIONER

## 2025-03-17 PROCEDURE — 3078F DIAST BP <80 MM HG: CPT | Mod: CPTII,,, | Performed by: NURSE PRACTITIONER

## 2025-03-17 PROCEDURE — 3008F BODY MASS INDEX DOCD: CPT | Mod: CPTII,,, | Performed by: NURSE PRACTITIONER

## 2025-03-17 PROCEDURE — 1160F RVW MEDS BY RX/DR IN RCRD: CPT | Mod: CPTII,,, | Performed by: NURSE PRACTITIONER

## 2025-03-17 PROCEDURE — 3074F SYST BP LT 130 MM HG: CPT | Mod: CPTII,,, | Performed by: NURSE PRACTITIONER

## 2025-03-17 PROCEDURE — 99205 OFFICE O/P NEW HI 60 MIN: CPT | Mod: ,,, | Performed by: NURSE PRACTITIONER

## 2025-03-17 PROCEDURE — 1159F MED LIST DOCD IN RCRD: CPT | Mod: CPTII,,, | Performed by: NURSE PRACTITIONER

## 2025-03-17 RX ORDER — METHYLPHENIDATE HYDROCHLORIDE 18 MG/1
18 TABLET ORAL EVERY MORNING
Qty: 30 TABLET | Refills: 0 | Status: SHIPPED | OUTPATIENT
Start: 2025-03-17 | End: 2025-04-16 | Stop reason: DRUGHIGH

## 2025-03-17 RX ORDER — FLUOXETINE HYDROCHLORIDE 40 MG/1
40 CAPSULE ORAL NIGHTLY
Qty: 90 CAPSULE | Refills: 3 | Status: SHIPPED | OUTPATIENT
Start: 2025-03-17 | End: 2026-03-17

## 2025-03-17 NOTE — PROGRESS NOTES
Outpatient Psychiatry Initial Visit (MD/NP)    3/17/2025    Lori Flynn, a 39 y.o. female, presenting for initial evaluation visit. Met with patient.    Reason for Encounter: self-referral. Patient complains of   Chief Complaint   Patient presents with    Anxiety       History of Present Illness: Has had anxiety and depression since her father  several years ago (). Has taken Fluoxetine and Trintellix for depression as well as Clonazepam. She was very close to her father and this was quite difficult for her. She has also been on Dextroamphetamine-amphetamine for ADHD. She has taken multiple medications in the past for ADHD, Depression, and Anxiety. Is not currently having any panic attacks, but has had them in the past.       Past Psychiatric History:  Prior diagnoses:  ADHD, Anxiety, and Depression  Inpatient psychiatric treatment: None  Outpatient psychiatric treatment: None  Prior medications:  Trintellix, Fluoxetine, Buspirone, Adderall  Current medications:  Fluoxetine  Prior suicide attempts: None  Prior history self harm: None  Prior psychotherapy: None  Prior psychological testing: None  Substance abuse: None     Review Of Systems:     Pertinent items are noted in HPI.    Current Evaluation:     Patient  reviewed this visit.     PHQ-9: Little interest or pleasure in doing things: (Patient-Rptd) (P) Several days  Feeling down, depressed, or hopeless: (Patient-Rptd) (P) Not at all  Trouble falling or staying asleep, or sleeping too much: (Patient-Rptd) (P) More than half the days  Feeling tired or having little energy: (Patient-Rptd) (P) More than half the days  Poor appetite or overeating: (Patient-Rptd) (P) More than half the days  Feeling bad about yourself - or that you are a failure or have let yourself or your family down: (Patient-Rptd) (P) Not at all  Trouble concentrating on things, such as reading the newspaper or watching television: (Patient-Rptd) (P) Nearly every day  Moving or  speaking so slowly that other people could have noticed. Or the opposite - being so fidgety or restless that you have been moving around a lot more than usual: (Patient-Rptd) (P) Not at all  Thoughts that you would be better off dead, or of hurting yourself in some way: (Patient-Rptd) (P) Not at all  PHQ-9 Total Score: (Patient-Rptd) (P) 10  If you checked off any problems, how difficult have these problems made it for you to do your work, take care of things at home, or get along with other people?: (Patient-Rptd) (P) Not difficult at all  PHQ-9 Total Score: (Patient-Rptd) (P) 10     CHARLES-7: CHARLES-7 Questionnaire  Feeling nervous, anxious, or on edge: (Patient-Rptd) Nearly every day  Not being able to stop or control worrying: (Patient-Rptd) Nearly every day  Worrying too much about different things: (Patient-Rptd) Nearly every day  Trouble relaxing: (Patient-Rptd) Nearly every day  Being so restless that it is hard to sit still: (Patient-Rptd) More than half the days  Becoming easily annoyed or irritable: (Patient-Rptd) Nearly every day  Feeling afraid as if something awful might happen: (Patient-Rptd) Nearly every day  CHARLES-7 Total Score: (Patient-Rptd) 20    Mood Disorder Questionnaire:       3/17/2025     9:07 AM   MDQ Scale   you felt so good or so hyper that other people thought you were not your normal self or you were so hyper that you got into trouble? 1   you were so irritable that you shouted at people or started fights or arguments? 1   you felt much more self-confident than usual? 0   you got much less sleep than usual and found that you didn't really miss it? 1   you were more talkative or spoke much faster than usual? 1   thoughts raced through your head or you couldn't slow your mind down? 1   you were so easily distracted by things around you that you had trouble concentrating or staying on track? 1   you had more energy than usual? 0   you were much more active or did many more things than usual? 1    you were much more social or outgoing than usual, for example, you telephoned friends in the middle of the night? 0   you were much more interested in sex than usual? 0   you did things that were unusual for you or that other people might have thought were excessive, foolish, or risky? 0   spending money got you or your family in trouble? 0   If you checked YES to more than one of the above, have several of these ever happened during the same period of time? 1   How much of a problem did any of these cause you - like being unable to work; having family, money or legal troubles; getting into arguments or fights? Moderate problem   Mood Disorder Questionnaire Score  7        Patient-reported     ASRS:       3/17/2025     9:09 AM   Adult ADHD Self-Report Scale   How often do you have trouble wrapping up the final details of a project once the chanllenging parts have been done? 3   How often do you have difficulty getting things in order when you have to do a task that requires organization? 4   How often do you have problems remembering appointments or obligations? 3   When you have a task that requires a lot of thought, how often do you avoid or delay getting started? 4   How often do you fidget or squirm with your hands or feet when you have to sit down for a long time? 4   How often do you feel overly active and compelled to do things, like you were driven by a motor? 3   Part A Score 21    How often do you make careless mistakes when you have to work on a boring or difficult project? 3   How often do you have difficulty keeping your attention when you are doing boring or repetitive work? 4   How often do you have difficulty concentrating on what people say to you, even when they are speaking to you directly? 4   How often do you misplace or have difficulty finding things at home or at work? 4   How often are you distracted by activity or noise around you? 4   How often do you leave your seat in meetings or other  "situations in which you are expected to remain seated? 3   How often do you feel restless or fidgety? 4   How often do you have difficulty unwinding and relaxing when you have time to yourself? 4   How often do you find yourself talking too much when you are in social situations? 3   When you're in a conversation, how often do you find yourself finishing the sentences of the people you are talking to before they can finish them themselves? 4   How often do you have difficulty waiting your turn in situations when turn taking is required? 2   How often do you interrupt others when they are busy? 3   Part B Score 42        Patient-reported       Nutritional Screening: Considering the patient's height and weight, medications, medical history and preferences, should a referral be made to the dietitian? no    Constitutional  Vitals:  Most recent vital signs, dated greater than 90 days prior to this appointment, were reviewed.    Vitals:    03/17/25 0903   BP: 112/60   Pulse: (!) 59   Resp: 20   Temp: 98.6 °F (37 °C)   SpO2: 97%   Weight: 92.5 kg (204 lb)   Height: 5' 7" (1.702 m)     Body mass index is 31.95 kg/m².     General:  age appropriate, well nourished, casually dressed, neatly groomed     Musculoskeletal  Muscle Strength/Tone:  no spasicity, no rigidity, no cogwheeling, no flaccidity, no paratonia, no dyskinesia, no dystonia   Gait & Station:  non-ataxic     Psychiatric  Speech:  no latency; no press   Mood & Affect:  anxious  congruent and appropriate   Thought Process:  normal and logical   Associations:  intact   Thought Content:  normal, no suicidality, no homicidality, delusions, or paranoia   Insight:  intact, has awareness of illness   Judgement: behavior is adequate to circumstances   Orientation:  grossly intact   Memory: intact for content of interview   Language: grossly intact   Attention Span & Concentration:  able to focus   Fund of Knowledge:  intact and appropriate to age and level of education, " familiar with aspects of current personal life, 4 of 4 recent presidents       Relevant Elements of Neurological Exam: normal gait    Functioning in Relationships:  Spouse/partner: Happily .  Peers: Has a small group of supportive friends.  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    Laboratory Data  Office Visit on 03/12/2025   Component Date Value Ref Range Status    Molecular Strep A, POC 03/12/2025 Negative  Negative Final     Acceptable 03/12/2025 Yes   Final         Medications  Encounter Medications[1]        Assessment - Diagnosis - Goals:     Impression: Increase Fluoxetine to 40 mg by mouth once daily. Begin methylphenidate at 18 mg for ADHD. She meets DSM V criteria for ADHD, combined type. Had marked difficulty focusing and staying on topic after queries were asked.      ICD-10-CM ICD-9-CM   1. Generalized anxiety disorder  F41.1 300.02   2. ADHD (attention deficit hyperactivity disorder), combined type  F90.2 314.01   3. Insomnia due to other mental disorder  F51.05 300.9    F99 327.02   4. Encounter for long-term (current) use of medications  Z79.899 V58.69       Strengths and Liabilities: Strength: Patient accepts guidance/feedback, Strength: Patient is expressive/articulate., Strength: Patient is intelligent., Strength: Patient is motivated for change., Strength: Patient is physically healthy., Strength: Patient has positive support network., Strength: Patient has reasonable judgment., Strength: Patient is stable., Liability: Patient lacks coping skills.    Treatment Goals:  Specify outcomes written in observable, behavioral terms:   Anxiety: reducing negative automatic thoughts, reducing physical symptoms of anxiety, and reducing time spent worrying (<30 minutes/day)  Depression: increasing energy, increasing interest in usual activities, increasing motivation, reducing fatigue, and reducing negative automatic thoughts    Treatment  Plan/Recommendations:   Medication Management: The risks and benefits of medication were discussed with the patient. Verbalized understanding.  The treatment plan and follow up plan were reviewed with the patient.  POCT UDS collected and is appropriate.      Medications:   Medication List with Changes/Refills   New Medications    FLUOXETINE 40 MG CAPSULE    Take 1 capsule (40 mg total) by mouth nightly.       Start Date: 3/17/2025 End Date: 3/17/2026    METHYLPHENIDATE HCL 18 MG CR TABLET    Take 1 tablet (18 mg total) by mouth every morning.       Start Date: 3/17/2025 End Date: --   Current Medications    FLUOXETINE 20 MG CAPSULE    Take 1 capsule (20 mg total) by mouth once daily.       Start Date: 4/9/2024  End Date: --    PROMETHAZINE-DEXTROMETHORPHAN (PROMETHAZINE-DM) 6.25-15 MG/5 ML SYRP    Take 5 mLs by mouth every 4 (four) hours as needed (cough). Do not take while driving       Start Date: 3/12/2025 End Date: --   Discontinued Medications    BUSPIRONE (BUSPAR) 10 MG TABLET    Take 1 tablet (10 mg total) by mouth 2 (two) times daily.       Start Date: 4/9/2024  End Date: 3/17/2025           Return to Clinic: 3 months    Patient instructed to please go to emergency department if feeling as though you are going to harm to yourself or others or if you are in crisis; or to please call the clinic to report any worsening of symptoms or problems associated with medication.     Total time: 64 minutes         [1]   Outpatient Encounter Medications as of 3/17/2025   Medication Sig Dispense Refill    amoxicillin-clavulanate 875-125mg (AUGMENTIN) 875-125 mg per tablet Take 1 tablet by mouth every 12 (twelve) hours. for 10 days 20 tablet 0    FLUoxetine 20 MG capsule Take 1 capsule (20 mg total) by mouth once daily. 90 capsule 3    promethazine-dextromethorphan (PROMETHAZINE-DM) 6.25-15 mg/5 mL Syrp Take 5 mLs by mouth every 4 (four) hours as needed (cough). Do not take while driving 473 mL 0    busPIRone (BUSPAR) 10 MG  tablet Take 1 tablet (10 mg total) by mouth 2 (two) times daily. (Patient not taking: Reported on 3/17/2025) 180 tablet 3     Facility-Administered Encounter Medications as of 3/17/2025   Medication Dose Route Frequency Provider Last Rate Last Admin    levonorgestreL (Mirena) 52 mg IUD 1 Intra Uterine Device  1 Intra Uterine Device Intrauterine  Shonda Conklin MD   1 Intra Uterine Device at 08/21/24 1500

## 2025-04-16 ENCOUNTER — HOSPITAL ENCOUNTER (OUTPATIENT)
Dept: RADIOLOGY | Facility: HOSPITAL | Age: 40
Discharge: HOME | End: 2025-04-16
Attending: NURSE PRACTITIONER
Payer: COMMERCIAL

## 2025-04-16 VITALS — HEIGHT: 67 IN | BODY MASS INDEX: 29.03 KG/M2 | WEIGHT: 185 LBS

## 2025-04-16 DIAGNOSIS — Z12.31 OTHER SCREENING MAMMOGRAM: ICD-10-CM

## 2025-04-16 DIAGNOSIS — F90.2 ADHD (ATTENTION DEFICIT HYPERACTIVITY DISORDER), COMBINED TYPE: Primary | ICD-10-CM

## 2025-04-16 PROCEDURE — 77067 SCR MAMMO BI INCL CAD: CPT | Mod: TC

## 2025-04-16 RX ORDER — METHYLPHENIDATE HYDROCHLORIDE 36 MG/1
36 TABLET ORAL EVERY MORNING
Qty: 30 TABLET | Refills: 0 | Status: SHIPPED | OUTPATIENT
Start: 2025-04-16

## 2025-05-08 ENCOUNTER — TELEPHONE (OUTPATIENT)
Dept: FAMILY MEDICINE | Facility: CLINIC | Age: 40
End: 2025-05-08
Payer: COMMERCIAL

## 2025-05-08 NOTE — TELEPHONE ENCOUNTER
Copied from CRM #[id number]. Topic: Appointments - Appointment Rescheduling  >> May 8, 2025 11:31 AM Olivia wrote:  Pt has appt on May 20 but daughter has awards day. She can come after lunch or come another day. But needs to get in to get refills. Next avail appt was in Aug.  Who Called: Lori Flynn    Caller is requesting a sooner appointment. Caller declined first available         Patient's Preferred Phone Number on File: [phone]   Best Call Back Number, if different:  Additional Information:

## 2025-05-20 ENCOUNTER — OFFICE VISIT (OUTPATIENT)
Dept: BEHAVIORAL HEALTH | Facility: CLINIC | Age: 40
End: 2025-05-20
Payer: COMMERCIAL

## 2025-05-20 VITALS
DIASTOLIC BLOOD PRESSURE: 89 MMHG | TEMPERATURE: 98 F | SYSTOLIC BLOOD PRESSURE: 133 MMHG | RESPIRATION RATE: 19 BRPM | WEIGHT: 193.81 LBS | HEIGHT: 67 IN | BODY MASS INDEX: 30.42 KG/M2 | HEART RATE: 85 BPM | OXYGEN SATURATION: 96 %

## 2025-05-20 DIAGNOSIS — F90.2 ADHD (ATTENTION DEFICIT HYPERACTIVITY DISORDER), COMBINED TYPE: ICD-10-CM

## 2025-05-20 DIAGNOSIS — F99 INSOMNIA DUE TO OTHER MENTAL DISORDER: ICD-10-CM

## 2025-05-20 DIAGNOSIS — F51.05 INSOMNIA DUE TO OTHER MENTAL DISORDER: ICD-10-CM

## 2025-05-20 DIAGNOSIS — F41.1 GENERALIZED ANXIETY DISORDER: Primary | ICD-10-CM

## 2025-05-20 PROCEDURE — 1159F MED LIST DOCD IN RCRD: CPT | Mod: CPTII,,, | Performed by: NURSE PRACTITIONER

## 2025-05-20 PROCEDURE — 1160F RVW MEDS BY RX/DR IN RCRD: CPT | Mod: CPTII,,, | Performed by: NURSE PRACTITIONER

## 2025-05-20 PROCEDURE — 3075F SYST BP GE 130 - 139MM HG: CPT | Mod: CPTII,,, | Performed by: NURSE PRACTITIONER

## 2025-05-20 PROCEDURE — G2211 COMPLEX E/M VISIT ADD ON: HCPCS | Mod: ,,, | Performed by: NURSE PRACTITIONER

## 2025-05-20 PROCEDURE — 3079F DIAST BP 80-89 MM HG: CPT | Mod: CPTII,,, | Performed by: NURSE PRACTITIONER

## 2025-05-20 PROCEDURE — 3008F BODY MASS INDEX DOCD: CPT | Mod: CPTII,,, | Performed by: NURSE PRACTITIONER

## 2025-05-20 PROCEDURE — 99214 OFFICE O/P EST MOD 30 MIN: CPT | Mod: ,,, | Performed by: NURSE PRACTITIONER

## 2025-05-20 RX ORDER — METHYLPHENIDATE HYDROCHLORIDE 36 MG/1
36 TABLET ORAL EVERY MORNING
Qty: 30 TABLET | Refills: 0 | Status: SHIPPED | OUTPATIENT
Start: 2025-05-20

## 2025-05-20 RX ORDER — METHYLPHENIDATE HYDROCHLORIDE 36 MG/1
36 TABLET ORAL EVERY MORNING
Qty: 30 TABLET | Refills: 0 | Status: SHIPPED | OUTPATIENT
Start: 2025-07-19

## 2025-05-20 RX ORDER — METHYLPHENIDATE HYDROCHLORIDE 36 MG/1
36 TABLET ORAL EVERY MORNING
Qty: 30 TABLET | Refills: 0 | Status: SHIPPED | OUTPATIENT
Start: 2025-06-19

## 2025-05-27 NOTE — PROGRESS NOTES
Outpatient Psychiatry Follow-Up Visit (MD/NP)    5/20/2025    Clinical Status of Patient:  Outpatient (Ambulatory)    Chief Complaint:  Lori Flynn is a 39 y.o. female who presents today for follow-up of depression, anxiety, and attention problems.  Met with patient.      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 Discussed the nature of anxiety and the physiological responses and how to control them through the use of coping skills and mechanisms. Discussed and recommended practicing mindfulness and meditation to limit stressors, anxiety, and depression symptoms.      Psychotherapy:  Target symptoms: distractability, lack of focus, recurrent depression, anxiety , work stress  Why chosen therapy is appropriate versus another modality: relevant to diagnosis, patient responds to this modality  Outcome monitoring methods: self-report, observation, checklist/rating scale  Therapeutic intervention type: supportive psychotherapy  Topics discussed/themes: work stress, difficulty managing affect in interpersonal relationships, building skills sets for symptom management, symptom recognition, financial stressors  The patient's response to the intervention is accepting. The patient's progress toward treatment goals is good.   Duration of intervention: 15 minutes.      Patient  reviewed this visit.     Review of Systems   PSYCHIATRIC: Pertinant items are noted in the narrative.  CONSTITUTIONAL: No weight gain or loss.   RESPIRATORY: No shortness of breath.  CARDIOVASCULAR: No tachycardia or chest pain.    Past Medical, Family and Social History: The patient's past medical, family and social history have been reviewed and updated as appropriate within the electronic medical record - see encounter notes.    Perception of medication efficacy: Working well.  Medications tried and failed: See history.    Compliance:  "YES    Side effects: None    Risk Parameters:  Patient reports no suicidal ideation  Patient reports no homicidal ideation  Patient reports no self-injurious behavior  Patient reports no violent behavior    Exam (detailed: at least 9 elements; comprehensive: all 15 elements)     PHQ-9: Little interest or pleasure in doing things: (Patient-Rptd) (P) Not at all  Feeling down, depressed, or hopeless: (Patient-Rptd) (P) Not at all  Trouble falling or staying asleep, or sleeping too much: (Patient-Rptd) (P) Several days  Feeling tired or having little energy: (Patient-Rptd) (P) Several days  Poor appetite or overeating: (Patient-Rptd) (P) Several days  Feeling bad about yourself - or that you are a failure or have let yourself or your family down: (Patient-Rptd) (P) Not at all  Trouble concentrating on things, such as reading the newspaper or watching television: (Patient-Rptd) (P) Several days  Moving or speaking so slowly that other people could have noticed. Or the opposite - being so fidgety or restless that you have been moving around a lot more than usual: (Patient-Rptd) (P) Several days  Thoughts that you would be better off dead, or of hurting yourself in some way: (Patient-Rptd) (P) Not at all  PHQ-9 Total Score: (Patient-Rptd) (P) 5  If you checked off any problems, how difficult have these problems made it for you to do your work, take care of things at home, or get along with other people?: (Patient-Rptd) (P) Somewhat difficult  PHQ-9 Total Score: (Patient-Rptd) (P) 5    Constitutional  Vitals:  Most recent vital signs, dated greater than 90 days prior to this appointment, were reviewed.   Vitals:    05/20/25 1408   BP: 133/89   Pulse: 85   Resp: 19   Temp: 97.9 °F (36.6 °C)   TempSrc: Temporal   SpO2: 96%   Weight: 87.9 kg (193 lb 12.8 oz)   Height: 5' 7" (1.702 m)     Body mass index is 30.35 kg/m².     General:  age appropriate, well nourished, casually dressed, obese     Musculoskeletal  Muscle " Strength/Tone:  no spasicity, no rigidity, no cogwheeling, no flaccidity, no paratonia, no dyskinesia   Gait & Station:  non-ataxic     Psychiatric  Speech:  no latency; no press   Mood & Affect:  anxious  congruent and appropriate   Thought Process:  normal and logical   Associations:  intact   Thought Content:  normal, no suicidality, no homicidality, delusions, or paranoia   Insight:  intact, has awareness of illness   Judgement: behavior is adequate to circumstances   Orientation:  grossly intact   Memory: intact for content of interview   Language: grossly intact   Attention Span & Concentration:  able to focus   Fund of Knowledge:  intact and appropriate to age and level of education, familiar with aspects of current personal life     Assessment and Diagnosis   Status/Progress: Based on the examination today, the patient's problem(s) is/are improved and adequately but not ideally controlled.  New problems have not been presented today.   Co-morbidities, Diagnostic uncertainty, and Lack of compliance are not complicating management of the primary condition.  There are no active rule-out diagnoses for this patient at this time.     General Impression: Will increase Fluoxetine to 40 mg daily for better control of anxiety.      ICD-10-CM ICD-9-CM   1. Generalized anxiety disorder  F41.1 300.02   2. Insomnia due to other mental disorder  F51.05 300.9    F99 327.02   3. ADHD (attention deficit hyperactivity disorder), combined type  F90.2 314.01       Intervention/Counseling/Treatment Plan   Medication Management: The risks and benefits of medication were discussed with the patient. Verbalized understanding.  Labs, Diagnostic Studies: order POCT UDS, reviewed POCT UDS  Counseling provided with patient as follows: importance of compliance with chosen treatment options was emphasized, risks and benefits of treatment options, including medications, were discussed with the patient, risk factor reduction, prognosis,  patient education, instructions for  management, treatment, and follow-up were reviewed  Visit today included increased complexity associated with the care of the episodic problem Generalized Anxiety Disorder addressed and managing the longitudinal care of the patient due to the serious and/or complex managed problem(s) ADHD, Insomnia.    Medications:   Medication List with Changes/Refills   New Medications    METHYLPHENIDATE HCL 36 MG CR TABLET    Take 1 tablet (36 mg total) by mouth every morning.       Start Date: 6/19/2025 End Date: --    METHYLPHENIDATE HCL 36 MG CR TABLET    Take 1 tablet (36 mg total) by mouth every morning.       Start Date: 5/20/2025 End Date: --   Current Medications    FLUOXETINE 40 MG CAPSULE    Take 1 capsule (40 mg total) by mouth nightly.       Start Date: 3/17/2025 End Date: 3/17/2026    PROMETHAZINE-DEXTROMETHORPHAN (PROMETHAZINE-DM) 6.25-15 MG/5 ML SYRP    Take 5 mLs by mouth every 4 (four) hours as needed (cough). Do not take while driving       Start Date: 3/12/2025 End Date: --   Changed and/or Refilled Medications    Modified Medication Previous Medication    METHYLPHENIDATE HCL 36 MG CR TABLET methylphenidate HCl 36 MG CR tablet       Take 1 tablet (36 mg total) by mouth every morning.    Take 1 tablet (36 mg total) by mouth every morning.       Start Date: 7/19/2025 End Date: --    Start Date: 4/16/2025 End Date: 5/20/2025   Discontinued Medications    FLUOXETINE 20 MG CAPSULE    Take 1 capsule (20 mg total) by mouth once daily.       Start Date: 4/9/2024  End Date: 5/20/2025           Return to Clinic: 3 months    Patient instructed to please go to emergency department if feeling as though you are going to harm to yourself or others or if you are in crisis; or to please call the clinic to report any worsening of symptoms or problems associated with medication.     Total Time: 46 minutes

## 2025-08-14 ENCOUNTER — OFFICE VISIT (OUTPATIENT)
Dept: OBSTETRICS AND GYNECOLOGY | Facility: CLINIC | Age: 40
End: 2025-08-14
Payer: COMMERCIAL

## 2025-08-14 VITALS
DIASTOLIC BLOOD PRESSURE: 84 MMHG | SYSTOLIC BLOOD PRESSURE: 135 MMHG | HEIGHT: 67 IN | WEIGHT: 197.81 LBS | BODY MASS INDEX: 31.05 KG/M2

## 2025-08-14 DIAGNOSIS — Z12.4 SCREENING FOR MALIGNANT NEOPLASM OF THE CERVIX: Primary | ICD-10-CM

## 2025-08-14 DIAGNOSIS — Z30.431 ENCOUNTER FOR ROUTINE CHECKING OF INTRAUTERINE CONTRACEPTIVE DEVICE (IUD): ICD-10-CM

## 2025-08-14 DIAGNOSIS — Z01.411 ENCOUNTER FOR GYNECOLOGICAL EXAMINATION (GENERAL) (ROUTINE) WITH ABNORMAL FINDINGS: ICD-10-CM

## 2025-08-14 PROCEDURE — 99999 PR PBB SHADOW E&M-EST. PATIENT-LVL III: CPT | Mod: PBBFAC,,, | Performed by: OBSTETRICS & GYNECOLOGY

## 2025-08-14 PROCEDURE — 3079F DIAST BP 80-89 MM HG: CPT | Mod: CPTII,,, | Performed by: OBSTETRICS & GYNECOLOGY

## 2025-08-14 PROCEDURE — 3075F SYST BP GE 130 - 139MM HG: CPT | Mod: CPTII,,, | Performed by: OBSTETRICS & GYNECOLOGY

## 2025-08-14 PROCEDURE — 87626 HPV SEP HI-RSK TYP&POOL RSLT: CPT | Mod: ,,, | Performed by: CLINICAL MEDICAL LABORATORY

## 2025-08-14 PROCEDURE — 1159F MED LIST DOCD IN RCRD: CPT | Mod: CPTII,,, | Performed by: OBSTETRICS & GYNECOLOGY

## 2025-08-14 PROCEDURE — 3008F BODY MASS INDEX DOCD: CPT | Mod: CPTII,,, | Performed by: OBSTETRICS & GYNECOLOGY

## 2025-08-14 PROCEDURE — 1160F RVW MEDS BY RX/DR IN RCRD: CPT | Mod: CPTII,,, | Performed by: OBSTETRICS & GYNECOLOGY

## 2025-08-14 PROCEDURE — 99396 PREV VISIT EST AGE 40-64: CPT | Mod: S$PBB,,, | Performed by: OBSTETRICS & GYNECOLOGY

## 2025-08-14 PROCEDURE — 88141 CYTOPATH C/V INTERPRET: CPT | Mod: ,,, | Performed by: PATHOLOGY

## 2025-08-14 PROCEDURE — 99213 OFFICE O/P EST LOW 20 MIN: CPT | Mod: PBBFAC | Performed by: OBSTETRICS & GYNECOLOGY

## 2025-08-14 PROCEDURE — 88142 CYTOPATH C/V THIN LAYER: CPT | Mod: TC,GCY | Performed by: OBSTETRICS & GYNECOLOGY

## 2025-08-14 RX ORDER — PANTOPRAZOLE SODIUM 40 MG/1
TABLET, DELAYED RELEASE ORAL
COMMUNITY

## 2025-08-19 LAB
GH SERPL-MCNC: ABNORMAL NG/ML
INSULIN SERPL-ACNC: ABNORMAL U[IU]/ML
LAB AP CLINICAL INFORMATION: ABNORMAL
LAB AP GYN INTERPRETATION: ABNORMAL
LAB AP PAP DISCLAIMER COMMENTS: ABNORMAL
RENIN PLAS-CCNC: ABNORMAL NG/ML/H

## 2025-08-21 LAB
HPV 16: POSITIVE
HPV 18: NEGATIVE
HPV OTHER: NEGATIVE

## 2025-08-22 ENCOUNTER — OFFICE VISIT (OUTPATIENT)
Dept: BEHAVIORAL HEALTH | Facility: CLINIC | Age: 40
End: 2025-08-22

## 2025-08-22 DIAGNOSIS — Z79.899 ENCOUNTER FOR LONG-TERM (CURRENT) USE OF MEDICATIONS: ICD-10-CM

## 2025-08-22 DIAGNOSIS — F41.1 GENERALIZED ANXIETY DISORDER: Primary | ICD-10-CM

## 2025-08-22 DIAGNOSIS — F90.2 ADHD (ATTENTION DEFICIT HYPERACTIVITY DISORDER), COMBINED TYPE: ICD-10-CM

## 2025-08-27 ENCOUNTER — PATIENT MESSAGE (OUTPATIENT)
Dept: OBSTETRICS AND GYNECOLOGY | Facility: CLINIC | Age: 40
End: 2025-08-27
Payer: COMMERCIAL

## 2025-08-27 ENCOUNTER — TELEPHONE (OUTPATIENT)
Dept: OBSTETRICS AND GYNECOLOGY | Facility: CLINIC | Age: 40
End: 2025-08-27
Payer: COMMERCIAL

## 2025-08-28 ENCOUNTER — HOSPITAL ENCOUNTER (OUTPATIENT)
Dept: RADIOLOGY | Facility: HOSPITAL | Age: 40
Discharge: HOME | End: 2025-08-28
Attending: OBSTETRICS & GYNECOLOGY
Payer: COMMERCIAL

## 2025-08-28 ENCOUNTER — OFFICE VISIT (OUTPATIENT)
Dept: OBSTETRICS AND GYNECOLOGY | Facility: CLINIC | Age: 40
End: 2025-08-28
Payer: COMMERCIAL

## 2025-08-28 VITALS
HEIGHT: 67 IN | BODY MASS INDEX: 30.86 KG/M2 | WEIGHT: 196.63 LBS | DIASTOLIC BLOOD PRESSURE: 56 MMHG | HEART RATE: 72 BPM | SYSTOLIC BLOOD PRESSURE: 112 MMHG

## 2025-08-28 DIAGNOSIS — Z30.431 ENCOUNTER FOR ROUTINE CHECKING OF INTRAUTERINE CONTRACEPTIVE DEVICE (IUD): ICD-10-CM

## 2025-08-28 DIAGNOSIS — Z30.431 IUD CHECK UP: Primary | ICD-10-CM

## 2025-08-28 DIAGNOSIS — R87.810 ASCUS WITH POSITIVE HIGH RISK HPV CERVICAL: ICD-10-CM

## 2025-08-28 DIAGNOSIS — R87.610 ASCUS WITH POSITIVE HIGH RISK HPV CERVICAL: ICD-10-CM

## 2025-08-28 PROCEDURE — 99213 OFFICE O/P EST LOW 20 MIN: CPT | Mod: S$PBB,,, | Performed by: OBSTETRICS & GYNECOLOGY

## 2025-08-28 PROCEDURE — 3074F SYST BP LT 130 MM HG: CPT | Mod: CPTII,,, | Performed by: OBSTETRICS & GYNECOLOGY

## 2025-08-28 PROCEDURE — 99999 PR PBB SHADOW E&M-EST. PATIENT-LVL III: CPT | Mod: PBBFAC,,, | Performed by: OBSTETRICS & GYNECOLOGY

## 2025-08-28 PROCEDURE — 3078F DIAST BP <80 MM HG: CPT | Mod: CPTII,,, | Performed by: OBSTETRICS & GYNECOLOGY

## 2025-08-28 PROCEDURE — 1160F RVW MEDS BY RX/DR IN RCRD: CPT | Mod: CPTII,,, | Performed by: OBSTETRICS & GYNECOLOGY

## 2025-08-28 PROCEDURE — 1159F MED LIST DOCD IN RCRD: CPT | Mod: CPTII,,, | Performed by: OBSTETRICS & GYNECOLOGY

## 2025-08-28 PROCEDURE — 3008F BODY MASS INDEX DOCD: CPT | Mod: CPTII,,, | Performed by: OBSTETRICS & GYNECOLOGY

## 2025-08-28 PROCEDURE — 76857 US EXAM PELVIC LIMITED: CPT | Mod: 26,,, | Performed by: RADIOLOGY

## 2025-08-28 PROCEDURE — 99213 OFFICE O/P EST LOW 20 MIN: CPT | Mod: PBBFAC,25 | Performed by: OBSTETRICS & GYNECOLOGY

## 2025-08-28 PROCEDURE — 76857 US EXAM PELVIC LIMITED: CPT | Mod: TC
